# Patient Record
Sex: FEMALE
[De-identification: names, ages, dates, MRNs, and addresses within clinical notes are randomized per-mention and may not be internally consistent; named-entity substitution may affect disease eponyms.]

---

## 2018-06-23 ENCOUNTER — HOSPITAL ENCOUNTER (EMERGENCY)
Dept: HOSPITAL 92 - SCSER | Age: 73
Discharge: HOME | End: 2018-06-23
Payer: COMMERCIAL

## 2018-06-23 DIAGNOSIS — N39.0: Primary | ICD-10-CM

## 2018-06-23 DIAGNOSIS — F41.9: ICD-10-CM

## 2018-06-23 DIAGNOSIS — I10: ICD-10-CM

## 2018-06-23 DIAGNOSIS — Z79.891: ICD-10-CM

## 2018-06-23 DIAGNOSIS — J30.2: ICD-10-CM

## 2018-06-23 DIAGNOSIS — Z79.899: ICD-10-CM

## 2018-06-23 DIAGNOSIS — D64.9: ICD-10-CM

## 2018-06-23 LAB
ALBUMIN SERPL BCG-MCNC: 3.7 G/DL (ref 3.4–4.8)
ALP SERPL-CCNC: 78 U/L (ref 40–150)
ALT SERPL W P-5'-P-CCNC: 13 U/L (ref 8–55)
ANION GAP SERPL CALC-SCNC: 15 MMOL/L (ref 10–20)
AST SERPL-CCNC: 16 U/L (ref 5–34)
BACTERIA UR QL AUTO: (no result) HPF
BILIRUB SERPL-MCNC: 0.7 MG/DL (ref 0.2–1.2)
BUN SERPL-MCNC: 18 MG/DL (ref 9.8–20.1)
CALCIUM SERPL-MCNC: 9.6 MG/DL (ref 7.8–10.44)
CHLORIDE SERPL-SCNC: 105 MMOL/L (ref 98–107)
CO2 SERPL-SCNC: 22 MMOL/L (ref 23–31)
CREAT CL PREDICTED SERPL C-G-VRATE: 0 ML/MIN (ref 70–130)
GLOBULIN SER CALC-MCNC: 3.8 G/DL (ref 2.4–3.5)
GLUCOSE SERPL-MCNC: 116 MG/DL (ref 83–110)
HGB BLD-MCNC: 9.9 G/DL (ref 12–16)
MCH RBC QN AUTO: 26.7 PG (ref 27–31)
MCV RBC AUTO: 80.5 FL (ref 78–98)
MDIFF COMPLETE?: YES
PLATELET # BLD AUTO: 191 THOU/UL (ref 130–400)
PLATELET BLD QL SMEAR: (no result)
POTASSIUM SERPL-SCNC: 3.5 MMOL/L (ref 3.5–5.1)
PROT UR STRIP.AUTO-MCNC: 30 MG/DL
RBC # BLD AUTO: 3.69 MILL/UL (ref 4.2–5.4)
RBC UR QL AUTO: (no result) HPF (ref 0–3)
SODIUM SERPL-SCNC: 138 MMOL/L (ref 136–145)
SP GR UR STRIP: 1.01 (ref 1–1.03)
WBC # BLD AUTO: 10.8 THOU/UL (ref 4.8–10.8)

## 2018-06-23 PROCEDURE — 87149 DNA/RNA DIRECT PROBE: CPT

## 2018-06-23 PROCEDURE — 71046 X-RAY EXAM CHEST 2 VIEWS: CPT

## 2018-06-23 PROCEDURE — 81003 URINALYSIS AUTO W/O SCOPE: CPT

## 2018-06-23 PROCEDURE — 96361 HYDRATE IV INFUSION ADD-ON: CPT

## 2018-06-23 PROCEDURE — 87040 BLOOD CULTURE FOR BACTERIA: CPT

## 2018-06-23 PROCEDURE — 36415 COLL VENOUS BLD VENIPUNCTURE: CPT

## 2018-06-23 PROCEDURE — 80053 COMPREHEN METABOLIC PANEL: CPT

## 2018-06-23 PROCEDURE — 87086 URINE CULTURE/COLONY COUNT: CPT

## 2018-06-23 PROCEDURE — 81015 MICROSCOPIC EXAM OF URINE: CPT

## 2018-06-23 PROCEDURE — 96365 THER/PROPH/DIAG IV INF INIT: CPT

## 2018-06-23 PROCEDURE — 85025 COMPLETE CBC W/AUTO DIFF WBC: CPT

## 2018-06-23 PROCEDURE — 87077 CULTURE AEROBIC IDENTIFY: CPT

## 2018-06-23 PROCEDURE — 83605 ASSAY OF LACTIC ACID: CPT

## 2018-06-23 PROCEDURE — 87186 SC STD MICRODIL/AGAR DIL: CPT

## 2018-06-23 NOTE — RAD
PA AND LATERAL VIEWS CHEST:

6/23/18

 

HISTORY: 

Cough, fever, generalized weakness.

 

FINDINGS:  

The heart is enlarged. The lungs are well expanded with mild pulmonary vascular congestion. No pneumo
thoraces or large effusions are seen. 

 

IMPRESSION:  

Findings are suspicious for mild CHF.                                                                
                                                                                                     
                                                                                                     
                                                                                                     
                                                                                                     
                                                                                                     
                                                                                                     
                                                                                                     
                                                                                                     
                                                                                                     
                                                                                                     
                                                                                                     
                                                                                                     
                                                                                                     
                                                                                                     
                                                                                                     
                                                                                                     
                                                                                                     
                                                                                                     
                                                                                                     
                                                                                                     
                                                                                                     
                                                                                                     
                                                 

 

POS: [facility]

## 2020-06-01 NOTE — RAD
PORTABLE CHEST 1 VIEW:

 

Date:  06/01/2020

Time:  1333 hours

 

HISTORY:  

MediPort placement. 

 

COMPARISON:  

05/29/2020. 

 

FINDINGS:

Interval placement of right-sided Port-A-Cath is seen with tip in the projection of SVC. No pneumotho
rax seen. There is continued elevation of the left hemidiaphragm. The heart size is stable. 

 

 

POS: [facility]

## 2020-06-02 NOTE — OP
DATE OF PROCEDURE:  06/01/2020



PREOPERATIVE DIAGNOSIS:  Lymphoma.



POSTOPERATIVE DIAGNOSIS:  Lymphoma.



PROCEDURE PERFORMED:  Placement of right subclavian standard-sized power compatible

MediPort. 



ANESTHESIA:  Total intravenous anesthesia with local using a mixture of 1% lidocaine

with epinephrine and 0.25% Marcaine. 



INDICATIONS:  The patient is a 74-year-old  female, who is referred for

MediPort placement for chemotherapy administration.  She has diffuse lymphoma. 



DESCRIPTION OF OPERATION:  Informed consent was obtained.  The patient was taken to

the operating room where total intravenous anesthesia was obtained with the patient

in supine position.  Right periclavicular area was prepped with ChloraPrep and

draped in sterile fashion.  Local anesthetic was infiltrated and a large-gauge

needle was passed under the clavicle in the subclavian vein.  Guidewire was passed

through the needle and fluoroscopically confirmed to enter the superior vena cava.

Additional local anesthetic was infiltrated and transverse incision was created

based on needle insertion site.  A subcutaneous pocket was dissected inferiorly.

Introducer dilator was passed over the guidewire under fluoroscopic guidance.  The

guidewire and dilator were removed, and the catheter was passed through the

introducer.  The tip of the catheter was positioned at the atriocaval junction and

the catheter was trimmed to the appropriate length and secured to the locking hub of

the MediPort.  The port was then placed in the subcutaneous pocket where it was

secured to the pectoral fascia with 2 interrupted sutures of 3-0 Prolene.  The

incision was then closed in layers with 3-0 and 4-0 Monocryl.  Additional local

anesthetic was infiltrated.  The port was cannulated with a Hemphill needle and it

aspirated blood freely and was flushed with heparinized saline.  Dermabond was

placed externally on the skin incision.  There were no complications.  Blood loss

was negligible.  The patient tolerated the procedure well and was taken to recovery

room in stable condition. 



FINDINGS:  I placed a standard size power compatible MediPort in the right

subclavian vein uneventfully.  The port site was chosen secondary to her body

habitus.  There were no complications and essentially no blood loss.  Fluoroscopy

was used throughout the procedure.  The patient tolerated the procedure well and was

taken to recovery room in stable condition. 







Job ID:  [id number]

## 2020-06-12 NOTE — PDOC.HHP
Hospitalist HPI





- History of Present Illness


fever and neutropenia in clinic


History of Present Illness: 





Ms. 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 

received chemotherapy on Friday who was referred from the clinic for a 

neutropenic fever. Received chemotherapy this past Friday. Had fevers over the 

weekend, came to clinic, and reportedy had UA consistent with UTI. Started on 

Cipro and completed three days. Followed up in clinic this morning, was found 

to be neutropenic and 100.8F so was directly admitted.





On encounter, laying comfortably in bed and complains of abdominal pain that 

she thinks is because of antibiotics. Also endorses ulceration in mouth.  

Denies neck stiffness, headache, dysphagia, odynophagia, ocular drainage, 

rhinorrhea, facial pain, chest pain, pleuritic pain, cough, diarrhea, dysuria, 

hematuria.





Hospitalist ROS





- Review of Systems


Constitutional: reports: weakness, malaise.  denies: fever, chills, sweats


Eyes: denies: pain, conjunctivae inflammation, eyelid inflammation, redness


ENT: denies: ear pain, ear discharge, nose pain, nose discharge, nose congestion

, mouth pain, mouth swelling, throat pain, throat swelling


Respiratory: denies: cough, shortness of breath, pleuritic pain


Cardiovascular: denies: chest pain, palpitations


Gastrointestinal: reports: nausea, abdominal pain.  denies: vomiting, diarrhea, 

melena, hematochezia


Genitourinary: denies: dysuria, frequency, incontinence, hematuria


Skin: denies: rash, lesions





Hospitalist History





- Past Medical History


Source: patient, family


Cardiac: reports: no pertinent history


Pulmonary: reports: hypertension


CNS: reports: no pertinent history


Gastrointestinal: reports: no pertinent history


Heme/Onc: reports: no pertinent history


Hepatobiliary: reports: no pertinent history


Psych: reports: no pertinent history


Infectious Disease: reports: Other (recurrent UTIs)


ENT: reports: no pertinent history


Renal/: reports: UTI





- Past Surgical History


Past Surgical History: reports: Cystoscopy, Hysterectomy





- Family History


Family History: reports: no pertinent history





- Social History


Smoking Status: Never smoker


Alcohol: reports: None


Drugs: reports: none


Living Situation: With Family


Domestic Violence: Negative


Activity level: independent ambulation





- Exam


General Appearance: NAD, awake alert


Eye: PERRL, anicteric sclera


ENT - other findings: mulitple oral ulcerations


Neck: no JVD


Heart: no murmur, no gallops


Heart - other findings: regular rhythm, tachycardic; mediport right side thorax

, noninfected


Respiratory: CTAB, no wheezes, no rales, no ronchi


Gastrointestinal: soft, non-tender, non-distended, normal bowel sounds


Extremities: no edema


Neurological: cranial nerve grossly intact, normal sensation to touch, no 

weakness, no focal deficits, no new deficit


Psychiatric: normal affect, normal behavior, A&O x 3





Hospitalist Results





- Labs


Result Diagrams: 


 06/12/20 15:38





 06/12/20 15:38


Lab results: 


 











WBC  0.1 thou/uL (4.8-10.8)  L*  06/12/20  15:38    


 


Hgb  10.0 g/dL (12.0-16.0)  L  06/12/20  15:38    


 


Hct  29.8 % (36.0-47.0)  L  06/12/20  15:38    


 


MCV  82.8 fL (78.0-98.0)   06/12/20  15:38    


 


Plt Count  39 thou/uL (130-400)  L  06/12/20  15:38    














Hospitalist H&P A/P





- Problem


(1) Neutropenic fever


Code(s): D70.9 - NEUTROPENIA, UNSPECIFIED; R50.81 - FEVER PRESENTING WITH 

CONDITIONS CLASSIFIED ELSEWHERE   Status: Acute   





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


Code(s): C83.31 - DIFFUSE LARGE B-CELL LYMPHOMA, NODES OF HEAD, FACE, AND NECK 

  Status: Acute   





- Plan


Plan: 





#neutropenic fever


#mucositis


-patient recently diagnosed with large B cell lymphoma


-chemotherapy this past friday cytoxan, vincristine, adramycin, vatuxin


-Tmax 100.8, given cefepime x 1 in clinic


-recent urinalysis in clinic c/w UTI, completed cipro prior to presentation


-lymphoma can cause fever, lymphoma lysis can cause fever, treatment with 

abovementioned can cause fever and mucositis associated with fever


-mediport noninfected; no symtpoms of infection





-neutorpenic isolation


-continue cefepime


-urinalysis considering recent reported UTI and history of recurrent UTIs


-procalcitonin





#HTN


-continue home regimen


-IV antiHTN PRN





#history of DVT


-continue warfarin





Disposition/PPx


full code.


GI PPx: no indicated


DVT PPx: lovenox

## 2020-06-13 NOTE — CON
DATE OF CONSULTATION:  



REASON FOR CONSULT:  Neutropenic fever.



HISTORY OF PRESENT ILLNESS:  Ms. Fair is a pleasant 74-year-old female who has a

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

lymphadenopathy.  She has activated B-cell subtype with no evidence of double hit.

She completed cycle 1 of R-CHOP last Friday.  On Monday, she presented to our clinic

with urinary tract infection symptoms and was given Cipro.  Over the course of the

week, she began to have stomach pain, weight loss, and developed fever.  She

presented to our clinic on Friday for re-evaluation and appeared weak.  Her CBC

showed a white count of 0.1.  She was started on IV hydration and given 2 g of

cefepime in the clinic and admitted for neutropenic fever.  She also had oral thrush

with some stomatitis.  She denies diarrhea, cough, shortness of breath, or chest

pain. 



PAST MEDICAL HISTORY:  

1. 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

2. Left lower extremity DVT in March.

3. Rheumatoid arthritis.

4. Chronic anemia.

5. Hypertension.



PAST SURGICAL HISTORY:  

1. Hysterectomy.

2. Lymph node biopsy.



ALLERGIES:  NO KNOWN DRUG ALLERGIES.



HOME MEDICATIONS:  Fosamax, alprazolam, carvedilol, Flonase, loratadine, losartan,

nystatin, sertraline, and Coumadin. 



FAMILY HISTORY:  Daughter has a history of cervical cancer.



SOCIAL HISTORY:  She is , has 4 children.  Lives with her spouse.  No

alcohol, tobacco, or illicit drug use. 



REVIEW OF SYSTEMS:  A 10-point review of systems is negative except for noted in HPI.



PHYSICAL EXAMINATION:

VITAL SIGNS:  Temperature 98.6, pulse 89, respiratory rate 18, BP is 137/63.  She is

95% on room air. 

GENERAL:  This is a well-developed, well-nourished female, in no acute distress. 

HEENT:  Normocephalic, atraumatic.  Pupils are equal and reactive to light.  She has

thrush on her tongue and sores in her buccal cavity. 

NECK:  Supple. 

CV:  Regular rate and rhythm. 

LUNGS:  Clear. 

ABDOMEN:  Soft and nontender.  Bowel sounds are positive. 

EXTREMITIES:  No clubbing or cyanosis. 

SKIN:  No rash. 

HEMATOLOGICAL:  No petechiae or purpura. 

NEUROLOGICAL:  Nonfocal.



PERTINENT LABORATORY DATA AND X-RAYS:  Current WBCs are 0.1, hemoglobin 8.0,

hematocrit 24.6, platelet count is 33,000.  PT is 23.4, INR is 2.1.  Sodium is 140,

potassium 3.9, chloride 111, CO2 is 25, BUN is 12, creatinine 0.74, calcium 7.4,

bilirubin is 1, AST is 25, ALT is 43, alkaline phosphatase is 72.  Serum total

protein is 5.6, albumin 2.9, globulin 2.7.  Urine showed rare bacteria. 



ASSESSMENT:  

1. Neutropenic fever, likely from prior urinary tract infection.

2. High-grade B-cell lymphoma, status post cycle 1 of R-CHOP chemotherapy with

Neulasta support. 

3. Thrush.

4. History of deep venous thrombosis.

5. Thrombocytopenia secondary to chemo.



DISCUSSION:  The patient has been started on IV antibiotics, IV fluids, and nystatin

medication.  She is also receiving Magic mouthwash.  She feels significantly better

today.  She has just rare stomach pain and is now ambulating in the hallway.  Her

white count has remained low.  She did receive Neulasta.  I expect it to begin to

rise in the next 24 hours.  Her platelet count is 33,000.  We will hold her Coumadin

today and resume tomorrow as her platelets will hopefully be over 40,000.  If she is

significantly improved with the trending upward of her labs, she can be discharged

home to be followed up in the clinic next week.  Appreciate Sounds assistance with

medical management. 



Thank you for the consult.







Job ID:  [id number]

## 2020-06-13 NOTE — PDOC.HOSPP
- Subjective


Encounter Date: 06/13/20


Encounter Time: 07:00


Subjective: 





Pt seen for followup re: febrile neutropenia.  Pt feels slightly better.





- Objective


Vital Signs & Weight: 


 Vital Signs (12 hours)











  Temp Pulse Resp BP BP Pulse Ox


 


 06/13/20 11:36  98.6 F  89  18   137/63  95


 


 06/13/20 08:00       95


 


 06/13/20 07:58  98.3 F  80  18  165/70 H   95


 


 06/13/20 04:00  98.5 F  90  18   158/70 H  18 L








 Weight











Weight                         142 lb 1.6 oz














I&O: 


 











 06/12/20 06/13/20 06/14/20





 06:59 06:59 06:59


 


Intake Total  340 


 


Output Total  725 


 


Balance  -385 











Result Diagrams: 


 06/13/20 06:00





 06/13/20 06:00


Additional Labs: 





Labs and MARs reviewed by me











Hospitalist ROS





- Review of Systems


Cardiovascular: denies: chest pain, palpitations, orthopnea, paroxysmal noc. 

dyspnea, edema, light headedness


Gastrointestinal: denies: nausea, vomiting, abdominal pain, diarrhea, 

constipation, melena, hematochezia





- Medication


Medications: 


Active Medications











Generic Name Dose Route Start Last Admin





  Trade Name Freq  PRN Reason Stop Dose Admin


 


Acetaminophen  650 mg  06/12/20 15:02  06/13/20 00:37





  Tylenol  PO   650 mg





  Q4H PRN   Administration





  Headache/Fever/Mild Pain (1-3)   





     





     





     


 


Allopurinol  300 mg  06/13/20 09:00  06/13/20 08:46





  Zyloprim  PO   300 mg





  DAILY KESHIA   Administration





     





     





     





     


 


Alprazolam  0.25 mg  06/12/20 15:09  06/13/20 08:22





  Xanax  PO   0.25 mg





  TIDPRN PRN   Administration





  Anxiety   





     





     





     


 


Carvedilol  25 mg  06/12/20 21:00  06/13/20 08:46





  Coreg  PO   25 mg





  BID KESHIA   Administration





     





     





     





     


 


Al Hydroxide/Mg Hydroxide 60  0 ml  06/12/20 16:33  06/13/20 11:51





ml/ Lidocaine HCl 30 ml/  SSW   10 ml





Diphenhydramine HCl 75 mg  PRN PRN   Administration





  Mouth Irritation   





     





     





     


 


Hyoscyamine Sulfate  0.125 mg  06/12/20 15:09  06/12/20 17:05





  Levsin  PO   0.125 mg





  Q4H PRN   Administration





  Abdominal Cramping   





     





     





     


 


Sodium Chloride  1,000 mls @ 75 mls/hr  06/12/20 14:45  06/13/20 01:34





  Normal Saline 0.9%  IV   Not Given





  [id number] KESHIA   





     





     





     





     


 


Cefepime HCl 1 gm/ Sodium  100 mls @ 200 mls/hr  06/12/20 23:00  06/13/20 11:06





  Chloride  IVPB   100 mls





  1100,2300 KESHIA   Administration





     





     





     





     


 


Losartan Potassium  100 mg  06/13/20 09:00  06/13/20 08:47





  Cozaar  PO   100 mg





  DAILY KESHIA   Administration





     





     





     





     


 


Prednisone  100 mg  06/13/20 08:00  06/13/20 08:45





  Prednisone  PO   100 mg





  QAM-WM KESHIA   Administration





     





     





     





     


 


Sodium Chloride  10 ml  06/13/20 09:00  06/13/20 11:06





  Flush - Normal Saline  IVF   Not Given





  Q12HR KESHIA   





     





     





     





     














- Exam


General Appearance: awake alert


Eye: anicteric sclera


ENT: moist mucosa


ENT - other findings: mucositis


Neck: supple


Heart: RRR, no rubs


Respiratory: CTAB


Gastrointestinal: soft, non-tender


Extremities: no clubbing


Psychiatric: normal affect, normal behavior





Hosp A/P





- Plan





- Assessment


(1) Neutropenic fever


Code(s): D70.9 - NEUTROPENIA, UNSPECIFIED; R50.81 - FEVER PRESENTING WITH 

CONDITIONS CLASSIFIED ELSEWHERE   Status: Acute   





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


Code(s): C83.31 - DIFFUSE LARGE B-CELL LYMPHOMA, NODES OF HEAD, FACE, AND NECK 

  Status: Acute   





- Plan





#neutropenic fever


-continue IV cefepime


-await cultures


-neutorpenic isolation





#mucositis


-PRN mucositis cocktail





#HTN


-controlled





#history of DVT


-continue warfarin

## 2020-06-14 NOTE — PDOC.MOPN
Interval History: 





stomach pain this am, constipation. Mouth hurts





- Vital Signs


Vital Signs: 


 Vital Signs (12 hours)











  Temp Pulse Resp BP Pulse Ox


 


 06/14/20 08:00      97


 


 06/14/20 07:37  98.0 F  89  18  167/71 H  97








 Weight











Weight                         142 lb 1.6 oz

















- Physical Exam


General: No acute distress


HEENT: Other (mucositis)


Lungs: Clear to auscultation, Normal air movement


Cardiovascular: Regular rate, Normal S1, Normal S2, No murmurs, Gallops, Rubs


Abdomen: Normal bowel sounds, Other


Extremities: No clubbing, No cyanosis, No edema, Normal pulses, No tenderness/

swelling


Neurological: Normal gait, Normal speech, Strength at 5/5 X4 ext, Normal tone, 

Sensation intact, Cranial nerves 3-12 NL, Reflexes 2+





- Labs


Result Diagrams: 


 06/14/20 07:08





 06/13/20 06:00


Lab results: 


 Laboratory Results - last 24 hr





06/14/20 07:08: WBC 0.6 L*, RBC 2.91 L, Hgb 8.1 L, Hct 24.6 L, MCV 84.5, MCH 

27.7, MCHC 32.8, RDW 14.1, Plt Count 40 L, MPV 9.6, Neutrophils % (Manual) 55, 

Band Neuts % (Manual) 15 H, Lymphocytes % (Manual) 9 L, Reactive Lymphs % 3, 

Monocytes % (Manual) 18 H, Nucleated RBCs # (Man) 1 H, Plt Morphology Comment 

Appears Decreased L


06/14/20 06:00: PT 18.7 H, INR 1.6








Status: lab reviewed by me





A/P





- Problem


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


Current Visit: Yes   Code(s): C83.31 - DIFFUSE LARGE B-CELL LYMPHOMA, NODES OF 

HEAD, FACE, AND NECK   Status: Acute   





(2) Neutropenic fever


Current Visit: Yes   Code(s): D70.9 - NEUTROPENIA, UNSPECIFIED; R50.81 - FEVER 

PRESENTING WITH CONDITIONS CLASSIFIED ELSEWHERE   Status: Acute   





- Plan


Plan: 





1. continue nystation and MM wash


2. resume coumadin today, platelets improved


3. full liquid diet for mouth pain


4. Protonix, IVF


5. CBC in am

## 2020-06-14 NOTE — PDOC.HOSPP
- Subjective


Encounter Date: 06/14/20


Encounter Time: 07:00


Subjective: 





Pt een for followup for neutropenic fever.  No cough or urinary symptoms.





- Objective


Vital Signs & Weight: 


 Vital Signs (12 hours)











  Temp Pulse Resp BP Pulse Ox


 


 06/14/20 08:00      97


 


 06/14/20 07:37  98.0 F  89  18  167/71 H  97








 Weight











Weight                         142 lb 1.6 oz














I&O: 


 











 06/13/20 06/14/20 06/15/20





 06:59 06:59 06:59


 


Intake Total 340 1500 


 


Output Total 725 600 


 


Balance -385 900 











Result Diagrams: 


 06/14/20 07:08





 06/13/20 06:00


Additional Labs: 





Labs and MARs reviewed by me








Hospitalist ROS





- Review of Systems


Constitutional: denies: fever, chills, sweats, weakness, malaise


Respiratory: denies: cough, shortness of breath, SOB with excertion, pleuritic 

pain


Genitourinary: denies: dysuria, frequency, incontinence, hematuria


Skin: denies: rash, lesions, kadeem, bruising





- Medication


Medications: 


Active Medications











Generic Name Dose Route Start Last Admin





  Trade Name Freq  PRN Reason Stop Dose Admin


 


Acetaminophen  650 mg  06/12/20 15:02  06/13/20 00:37





  Tylenol  PO   650 mg





  Q4H PRN   Administration





  Headache/Fever/Mild Pain (1-3)   





     





     





     


 


Allopurinol  300 mg  06/13/20 09:00  06/14/20 08:35





  Zyloprim  PO   300 mg





  DAILY KESHIA   Administration





     





     





     





     


 


Alprazolam  0.25 mg  06/12/20 15:09  06/14/20 07:19





  Xanax  PO   0.25 mg





  TIDPRN PRN   Administration





  Anxiety   





     





     





     


 


Bisacodyl  10 mg  06/14/20 09:15  06/14/20 09:32





  Dulcolax  PO  06/14/20 12:00  10 mg





  NOW KESHIA   Administration





     





     





     





     


 


Carvedilol  25 mg  06/12/20 21:00  06/14/20 08:35





  Coreg  PO   25 mg





  BID KESHIA   Administration





     





     





     





     


 


Al Hydroxide/Mg Hydroxide 60  0 ml  06/12/20 16:33  06/14/20 07:19





ml/ Lidocaine HCl 30 ml/  SSW   10 ml





Diphenhydramine HCl 75 mg  PRN PRN   Administration





  Mouth Irritation   





     





     





     


 


Docusate Sodium  100 mg  06/14/20 09:15  06/14/20 09:32





  Colace  PO  06/14/20 11:15  100 mg





  NOW KESHIA   Administration





     





     





     





     


 


Hyoscyamine Sulfate  0.125 mg  06/12/20 15:09  06/12/20 17:05





  Levsin  PO   0.125 mg





  Q4H PRN   Administration





  Abdominal Cramping   





     





     





     


 


Sodium Chloride  1,000 mls @ 75 mls/hr  06/12/20 14:45  06/14/20 08:38





  Normal Saline 0.9%  IV   1,000 mls





  [id number] KESHIA   Administration





     





     





     





     


 


Cefepime HCl 1 gm/ Sodium  100 mls @ 200 mls/hr  06/12/20 23:00  06/13/20 23:17





  Chloride  IVPB   100 mls





  1100,2300 KESHIA   Administration





     





     





     





     


 


Losartan Potassium  100 mg  06/13/20 09:00  06/14/20 08:35





  Cozaar  PO   100 mg





  DAILY KESHIA   Administration





     





     





     





     


 


Multivitamins  1 tab  06/14/20 09:00  06/14/20 09:32





  Theragran  PO   1 tab





  DAILY KESHIA   Administration





     





     





     





     


 


Sodium Chloride  10 ml  06/13/20 09:00  06/14/20 08:36





  Flush - Normal Saline  IVF   Not Given





  Q12HR KESHIA   





     





     





     





     














- Exam


General Appearance: awake alert


Eye: anicteric sclera


ENT: normocephalic atraumatic


Neck: supple, no JVD


Heart: RRR, normal peripheral pulses


Respiratory: CTAB


Gastrointestinal: soft, non-tender


Extremities: no clubbing


Musculoskeletal: no muscle wasting


Psychiatric: normal affect, normal behavior





Hosp A/P





- Plan





- Assessment


(1) Neutropenic fever


Code(s): D70.9 - NEUTROPENIA, UNSPECIFIED; R50.81 - FEVER PRESENTING WITH 

CONDITIONS CLASSIFIED ELSEWHERE   Status: Acute   





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


Code(s): C83.31 - DIFFUSE LARGE B-CELL LYMPHOMA, NODES OF HEAD, FACE, AND NECK 

  Status: Acute   





- Plan





#neutropenic fever


-continue IV cefepime


- pt clinically improving


-neutorpenic isolation


- WBC imprpved to 600





#mucositis


-pt improving with PRN mucositis cocktail





#HTN


-controlled





#history of DVT


-continue coumadin

## 2020-06-15 NOTE — PDOC.MOPN
Interval History: 





large BM yesterday, mouth much better





- Vital Signs


Vital Signs: 


 Vital Signs (12 hours)











  Temp Pulse Resp BP BP Pulse Ox


 


 06/15/20 08:00  98.6 F  86  18  147/67 H   95


 


 06/15/20 04:54  98.4 F  88  18   171/74 H  96


 


 06/15/20 04:00  98.4 F  88  16   171/74 H  94 L








 Weight











Weight                         142 lb 1.6 oz

















- Physical Exam


General: Alert, Oriented x3, No acute distress


HEENT: Atraumatic, PERRLA, EOMI, Mucous membr. moist/pink


Lungs: Clear to auscultation, Normal air movement


Cardiovascular: Regular rate, Normal S1, Normal S2, No murmurs, Gallops, Rubs


Abdomen: Normal bowel sounds, Soft, No tenderness, No hepatospenomegaly, No 

masses


Skin: No rashes, No breakdown, No significant lesion


Neurological: Normal gait, Normal speech, Strength at 5/5 X4 ext, Normal tone, 

Sensation intact, Cranial nerves 3-12 NL, Reflexes 2+


Psych/Mental Status: Mental status NL, Mood NL





- Labs


Result Diagrams: 


 06/15/20 04:52





 06/13/20 06:00


Lab results: 


 Laboratory Results - last 24 hr





06/15/20 04:52: WBC 2.8 L, RBC 3.11 L, Hgb 8.3 L, Hct 25.9 L, MCV 83.1, MCH 

26.6 L, MCHC 32.0, RDW 14.1, Plt Count 53 L, MPV 10.2, Neutrophils % (Manual) 46

, Band Neuts % (Manual) 42 H, Lymphocytes % (Manual) 3 L, Monocytes % (Manual) 8

, Metamyelocytes % (Man) 1 H, Plt Morphology Comment Appears Decreased L


06/15/20 04:52: PT 19.0 H, INR 1.6








Status: lab reviewed by me





A/P





- Problem


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


Current Visit: Yes   Code(s): C83.31 - DIFFUSE LARGE B-CELL LYMPHOMA, NODES OF 

HEAD, FACE, AND NECK   Status: Acute   





(2) Neutropenic fever


Current Visit: Yes   Code(s): D70.9 - NEUTROPENIA, UNSPECIFIED; R50.81 - FEVER 

PRESENTING WITH CONDITIONS CLASSIFIED ELSEWHERE   Status: Acute   





- Plan


Plan: 





1. WBC and platelets improved


2. mouth pain better


3. ok to dc home and follow-up with DR. Connor on 6/25

## 2020-06-16 NOTE — DIS
DATE OF ADMISSION:  06/12/2020



DATE OF DISCHARGE:  06/15/2020



PRIMARY CARE PROVIDER:  Unknown.



DISCHARGE DIAGNOSES:  

1. Neutropenic fever.

2. Pancytopenia.

3. Hypokalemia.



CONDITION OF PATIENT ON THE DAY OF DISCHARGE:  Stable. 



I assessed Ms. Fair on the day of discharge.  She denies any chest pain or

shortness of breath.  Vital signs are stable.  S1 and S2 are heard, regular.  Lungs

are clear to auscultation bilaterally. 



CONSULTATIONS DURING THIS HOSPITALIZATION:  Oncology, Ms. Magalis Bingham.



HOSPITAL COURSE:  Ms. Fair is a pleasant 74-year-old lady, who was admitted to

[facility] on June 12, 2020, for neutropenic fever.  She was

seen by Oncology Service.  She was treated with intravenous antibiotics.  Her

neutropenia resolved.  She has been cleared for discharge by Oncology Service. 



On the day of discharge, she has INR 1.6.  White count 2800, hemoglobin 8.3, and

platelet count 53,000.  Sodium is 140, potassium 3.9, and creatinine 0.74. 



Many thanks for allowing me to participate in your patient's care.  Please feel free

to contact me with any questions or concerns. 



DISCHARGE MEDICATIONS:  Ciprofloxacin 500 mg 2 times a day for 3 more days.

Otherwise, no change was made to her pre-admission home medications. 



DIET:  Heart healthy.



ACTIVITY:  No restrictions.



DISCHARGE DESTINATION:  Home.



TIME SPENT:  Total amount of time spent in coordinating this discharge, 28 minutes.







Job ID:  [id number]

## 2020-06-29 NOTE — RAD
Exam: Lumbar puncture for CSF acquisition and intrathecal chemotherapy administration.



HISTORY: Lymphoma

Comparison none



FINDINGS: 5 lumbar type vertebra. Lumbar spine vertebral body heights are maintained. No fracture. Mi
ld degenerative changes throughout the lumbar spine.

Successful lumbar puncture. A total of 9 cc of clear CSF was acquired. Total of 0.5 mg of methotrexat
e was administered. Methotrexate was in a total of 10 cc solution. Patient tolerated the procedure

well. No immediate or postprocedure complications



TECHNIQUE: Consent obtained to perform a lumbar puncture for CSF acquisition and chemotherapy agent a
dministration. Patient's back was evaluated. The L3-L4 level was deemed appropriate. Skin was

prepped and draped in a sterile fashion. 1% lidocaine, buffered with sodium bicarbonate was used for 
local anesthesia. Under fluoroscopic guidance, a 22-gauge spinal needle was advanced into the CSF

space. Prompt flow of clear CSF to the hub of the needle. Via a short tubing catheter, total of 9 cc 
of CSF was collected. Patient tolerated the procedure well. No immediate or postprocedural

complications.



Exposure: 0.4 minutes; 155.9 mcg/sq m



IMPRESSION: Successful lumbar puncture for CSF acquisition and intrathecal contrast administration.



Reported By: Bee Arroyo 

Electronically Signed:  6/29/2020 11:24 AM

## 2020-07-20 NOTE — RAD
FLUOROSCOPICALLY GUIDED LUMBAR PUNCTURE

INTRATHECAL INJECTION OF CHEMOTHERAPEUTIC AGENT:



DATE:

7/20/2020



HISTORY:

74-year-old female with B-cell lymphoma for intrathecal injection of chemotherapy.



TECHNIQUE:

Signed informed consent obtained. Patient placed prone on fluoroscopy table. Skin of lower back prepa
red and draped in usual sterile fashion. 25-gauge needle used to apply buffered lidocaine

superficially and deeply.

Level selected:L2-3.

Approach:midline.

22-gauge spinal needle advanced into spinal canal under brief, intermittent fluoroscopy.

Upon return of CSF, 10 mL of CSF collected and placed into 4 separate vials.

12 mg of Methotrexate mixed with 9.52 (total of 10 mL) injected into intrathecal space.

Spinal needle was removed.

Patient tolerated procedure well. No complications.

Total fluoroscopy time:1.2 minutes.

Dose area product:146.9 uGy*m^2.



IMPRESSION:

1. Successful lumbar puncture:10 cerebrospinal fluid sent to laboratory for analysis.

2. Successful intrathecal injection of12 mg of methotrexate.



Reported By: Bradley Hall 

Electronically Signed:  7/20/2020 10:35 AM

## 2020-08-31 NOTE — HP
CHIEF COMPLAINT:  Hypoxia, short of breath.



HISTORY OF PRESENT ILLNESS:  The patient is very pleasant, but unfortunate

74-year-old  female, who has significant past medical histories of

nasopharyngeal large B-cell lymphoma, who is status post 4 rounds of chemo on 
August 17, 2020.  She presented to her oncologist's office today for followup.  While

there, she was found hypoxic and short of breath.  It was reported that her 
oxygen

was in the 70s and 80s on room air.  I have discussed with her oncologist, Dr. Connor, she thought that patient may have volume overloaded given the fact 
that she

recently received blood transfusion.  She was subsequently referred for direct

admission for possible diuresis.  I evaluated the patient in the oncology unit.
  Her

blood pressure on arrival was 74/38. She was given a stat bolus and systolic 
went up to 80s. She is still alert and oriented x 3.

The patient complained of shortness of breath, stated that has been gradually 
worsened for the last 3 to 4 days especially with

exertions.  She denies any chest pain, no fever.  No recent travel history.  No

COVID exposure.  She endorses some nonproductive cough.  She denies any 
significant

weight change.  No lower extremity swelling. 



ALLERGIES:  NO KNOWN DRUG ALLERGIES.



HOME MEDICATIONS:  Home medication list is not available to review at this time.



PAST MEDICAL HISTORY:  

1. Large B-cell lymphoma, undergoing chemotherapy.

2. Hypertension.



PAST SURGICAL HISTORY:  Hysterectomy.



SOCIAL HISTORY:  The patient lives with her .  She denies any history of

smoking or alcohol use. 



FAMILY HISTORY:  Significant for sister with heart disease.



REVIEW OF SYSTEMS:  Complete review of systems has been assessed and discussed 
with

the patient.  Negative and positive pertinent symptoms noted in HPI.  Otherwise,

complete review of systems reviewed and negative. 



LABORATORY DATA AND IMAGING STUDY:  The patient is directly admitted.  No lab is

available to review at this time. 



Stat chest x-ray showed multifocal pneumonia. I have personally reviewed. 



EKG: NSR with HR in 60s



PHYSICAL EXAMINATION:

VITAL SIGNS:  Temperature is 97.5, pulse 75, respiratory rate 18, O2 saturation 
95%

on 2 L, blood pressure 74/38. 

GENERAL APPEARANCE:  The patient appears to be not in acute distress. 

HEENT:  Normocephalic, atraumatic.  Mucous membranes moist. 

NECK:  Supple.  No lymphadenopathy.  No JVD. 

CARDIOVASCULAR:  Regular rate and rhythm.  S1 and S2 noted.  No murmur. 

PULMONOLOGY:  Bilateral crackles appreciated at bibasilar area, expiratory 
wheezes

appreciated. 

ABDOMEN:  Soft, nontender, nondistended.  Positive bowel sounds. 

EXTREMITIES:  No edema. 

NEUROLOGY:  Cranial nerves II through XII grossly intact.  No focal weakness. 

PSYCHIATRIC:  The patient alert and oriented x3 with normal affect.



ASSESSMENT AND PLAN:  This is an unfortunate 74-year-old  female, who 
has

significant past medical history of nasopharyngeal large B-cell lymphoma, 
undergoing

chemotherapy, who was sent from the oncology office for hypoxia.  Upon arrival, 
she

was found hypotensive with a blood pressure of 74/38.  She was given a stat 
normal

saline bolus.  Her blood pressures went up to the 80s. 





1. Acute hypoxic respiratory failure. Chest x-ray shows multifocal pneumonia.  
However, this could be due to volume given

history of recent blood transfusion.  Unfortunately, the patient is directly

admitted and no lab is available to review.  We will check BNP, stat labs, 
blood cultures.  We

will start her on empiric IV antibiotics for possible pneumonia.  We will 
transfer

the patient to [facility] for higher level of care.  We will obtain CTA to further 
assess

her lung fields and rule out PE.  We will monitor her blood pressure closely.  
Check 2D echo to

assess her LV function. 



1. Hypotension, need to rule out sepsis, especially with chest x-ray showing

multifocal pneumonia.  Her lab is not available at this time.  We will follow 
her

CBC.  The patient was given a liter of bolus.  Her blood pressure went up 
slightly.

The patient will need to transfer to step-down unit for close monitor.  She may 
need

vasopressor support.  The patient has been started on broad-spectrum IV 
antibiotics,

and blood culture has been obtained.  Follow echo. 



2. Multifocal pneumonia on chest x-ray.  It is unclear whether there is 
infectious

etiology versus pulmonary edema.  We will check CTA to rule out PE as well as to

assess her lung fields.  The patient will be covered with broad-spectrum IV

antibiotic to cover for possible hospital-associated pneumonia.  Follow culture.

Breathing treatments.



3. Large B-cell lymphoma, undergoing chemotherapy with Dr. Connor.  We will 
consult

her for further management. 



4. Deep venous thrombosis prophylaxis.  We will start her on Lovenox.



5. Gastrointestinal prophylaxis.  IV Pepcid.



CODE STATUS:  I have discussed code status with the patient and her family, she 
is

full code per her request. 



Thank you for allowing us to participate in this patient's care.







Job ID:  [id number]



MTDD

## 2020-08-31 NOTE — CT
CT ANGIOGRAM THORAX WITH IV CONTRAST AND 3-D RECONSTRUCTIONS



CLINICAL INDICATION:

Hypoxia, hypotension, history of B-cell lymphoma. 



COMPARISON:

4/23/2020



FINDINGS:

Pulmonary arteries: No filling defects are seen in the pulmonary arteries to suggest a pulmonary embo
karla.



Aorta: Scattered vascular calcifications are seen in the thoracic aorta. The thoracic aorta is normal
 in caliber without evidence of an aortic dissection.



Lungs: There are scattered areas of interstitial thickening as well as groundglass opacities. Similar
 finding was seen on the prior exam, but findings on today's examination are increased.  No

parenchymal consolidation or pleural fluid is seen.



Mediastinum: A right subclavian Mediport catheter remains in place. Calcified mediastinal and hilar l
ymph nodes are identified. Previously seen enlarged mediastinal lymph nodes as well as

infraclavicular lymphadenopathy has resolved. There is a mildly enlarged left paratracheal lymph node
 in the superior mediastinum measuring 1 cm in short axis dimension which is considerably decreased

in size previously measuring 2.6 cm in short axis dimension. The heart is mildly enlarged. Small hiat
al hernia is present.



Thyroid gland: Normal CT appearance.



Osseous structures: No suspicious lytic or sclerotic osseous lesion..



Chest wall: No abnormality visualized.



Upper abdomen: Within normal limits for phase of imaging. 



IMPRESSION:

1. No CT evidence of a pulmonary embolus. 

2. Mild cardiomegaly.

3. Areas of interstitial thickening and groundglass opacities seen throughout the lungs diffusely whi
ch has increased from prior exam. Findings could be related to pneumonitis.

4. Previously seen mediastinal lymphadenopathy has resolved. There has been considerable decrease in 
size of a large superior mediastinal lymph node as described above. No additional or new enlarged

lymph nodes are seen..



Reported By: Huseyin Jorge 

Electronically Signed:  8/31/2020 3:44 PM

## 2020-08-31 NOTE — RAD
EXAM:

Chest 2 views:



HISTORY:

Hypoxia and lymphoma



COMPARISON:

6/1/2020



FINDINGS:

There is an enlarged but stable cardiomediastinal silhouette.  Increased interstitial lung markings a
re present. There is stable elevation the left hemidiaphragm. A Mediport is unchanged in position.

There is no evidence of consolidation, mass, or pleural effusion.   No acute osseous abnormality.



IMPRESSION:

Worsening multifocal interstitial lung markings could represent an atypical infiltrate or interstitia
l lung disease.



Reported By: Robert Beal 

Electronically Signed:  8/31/2020 1:18 PM

## 2020-09-01 NOTE — CON
DATE OF CONSULTATION:  09/01/2020



REASON FOR CONSULTATION:  Pulmonary infiltrates.



CONSULTING PROVIDER:  Magalis Bingham, Nurse practitioner.



HISTORY OF PRESENT ILLNESS:  The patient is a pleasant 74-year-old female, who was

admitted to this facility on 08/31/2020 from Dr. Connor's office.  She presented

with increasing shortness of breath and hypoxemia.  She recently completed her 4th

round of chemotherapy for B-cell lymphoma.  I am told that consisted of Adriamycin,

Cytoxan, vincristine, and Rituxan.  She has had pancytopenia developed from the

chemo in the past. 



She denies cough, but says that she does feel short of breath.  She has had some

problems with nasal congestion.  She has had no hemoptysis. 



PAST MEDICAL HISTORY:  

1. 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

2. Hypertension.



PAST SURGICAL HISTORY:  

1. Hysterectomy.

2. MediPort placement.



SOCIAL HISTORY:  No history of smoking or alcohol use.  Lives at home with her

.  Fully active with daily activity of living. 



REVIEW OF SYSTEMS:  Has had no subjective fever, chills, nausea, vomiting,

hematemesis, melena, hematochezia, hematuria, or dysuria. 



MEDICATIONS:  Prior to admission in addition to her chemotherapy regimen, she was

taking; 

1. Metamucil.

2. Tylenol.

3. Levsin.

4. Norco.

5. Compazine.

6. Nystatin swish and swallow.

7. Loratadine.

8. Pantoprazole.

9. Megace.

10. Alprazolam.

11. Carvedilol.

12. Losartan.

13. Sertraline.

14. Arixtra.

15. Prednisone. 

Current inpatient medications include cefepime, vancomycin, and IV

methylprednisolone. 



PHYSICAL EXAMINATION:

VITAL SIGNS:  Her O2 saturation is running in the mid to low 90s on 3 L nasal

cannula, pulse 93, respirations in the mid 20s, blood pressure 120/65.  Last

measured temperature 98.4. 

GENERAL:  She is pleasant female, who sits up, gives history without limitation. 

HEENT:  Remarkable for alopecia. 

NECK:  No adenopathy or JVD or bruits. 

LUNGS:  She has inspiratory crackles predominantly at the bases.  She has decreased

diaphragmatic excursion on the left. 

CARDIOVASCULAR:  S1 and S2.  Regular without audible murmur. 

ABDOMEN:  Soft and nontender to palpation. 

EXTREMITIES:  No clubbing, cyanosis, or edema.



LABORATORY DATA:  Her echocardiogram showed no evidence of systolic dysfunction. 



Urinalysis showed greater than 50 white blood cells and 5 to 6 red blood cells.

Sodium 142, potassium 3.1, chloride 113, CO2 of 16, BUN 19, creatinine 0.9, glucose

131, magnesium 1.3.  Troponin 0.038.  .  White blood cell count 4.9,

hemoglobin 6.1, hematocrit 18.7, and platelet count 134.  I have reviewed her CT

from this visit as well as a previous PET scan from May.  I have also reviewed

multiple chest x-rays over the last several years.  She has copious areas of

ground-glass infiltration surrounded by fibrotic changes.  Additionally, she has a

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

result of the lymphoma.  Her current CT scan shows dissipation of lymphadenopathy. 



ASSESSMENT:  The patient is presenting with fairly profound ground-glass and

fibrotic changes on CT of the chest that were present at some degree back in May.

This would bring a differential diagnosis of nonspecific interstitial pneumonitis,

atypical infection, __________ effects of the chemotherapy.  It would appear that

this is not cardiac in nature given normal EF on recent echo.  However, high-output

heart failure cannot be ruled out given her profound anemia. 



Additionally, this patient has a paralyzed left hemidiaphragm that is probably

contributing to reduced lung capacity. 



PLAN:  

1. The patient is already on antibiotics and steroids and is beginning to feel

better.  For the time being, I would just monitor her on that regimen and see how

she does. 

2. Correct anemia with transfusion as you are doing.

3. For any worsening, we would need to go in and sample and perform a BAL and send

that off for atypical organisms such as Pneumocystis or fungal.  I would hold off on

the intervention for the time being. 

Thank you for the referral.  I will be glad to follow with you.  This was discussed

with Ms. Zachery. 







Job ID:  [id number]

## 2020-09-01 NOTE — PDOC.HOSPP
- Subjective


Subjective: 





Hb dropped to 6.1, she has been typed and crossed 2 units. 


feeling better. BP improved with intravascular expansion. 





- Objective


Vital Signs & Weight: 


 Vital Signs (12 hours)











  Temp Pulse Resp Pulse Ox


 


 09/01/20 14:21   93  32 H  94 L


 


 09/01/20 12:04  98.4 F   


 


 09/01/20 11:15   93  25 H  98


 


 09/01/20 08:41   95  28 H  100


 


 09/01/20 08:38   95  28 H  100


 


 09/01/20 08:00     100


 


 09/01/20 07:38  98.4 F   








 Weight











Admit Weight                   124 lb 14.4 oz


 


Weight                         124 lb 14.4 oz











 Most Recent Monitor Data











Heart Rate from ECG            92


 


NIBP                           120/65


 


NIBP BP-Mean                   83


 


Respiration from ECG           34


 


SpO2                           92














I&O: 


 











 08/31/20 09/01/20 09/02/20





 06:59 06:59 06:59


 


Intake Total  1010 0


 


Output Total  725 


 


Balance  285 0











Result Diagrams: 


 09/01/20 08:09





 09/01/20 08:09





Hospitalist ROS





- Medication


Medications: 


Active Medications











Generic Name Dose Route Start Last Admin





  Trade Name Freq  PRN Reason Stop Dose Admin


 


Albuterol/Ipratropium  3 ml  08/31/20 14:30  09/01/20 14:21





  Duoneb  NEB   3 ml





  [id number] KESHIA   Administration





     





     





     





     


 


Budesonide  0.5 mg  08/31/20 18:30  09/01/20 08:41





  Pulmicort Neb Solution  INH   0.5 mg





  BID-RT KESHIA   Administration





     





     





     





     


 


Fondaparinux  7.5 mg  09/01/20 09:00  09/01/20 10:28





  Arixtra  SC   7.5 mg





  DAILY KESHIA   Administration





     





     





     





     


 


Furosemide  40 mg  09/01/20 09:00  09/01/20 10:28





  Lasix  SLOW IVP   40 mg





  DAILY KESHIA   Administration





     





     





     





     


 


Cefepime HCl 1 gm/ Sodium  100 mls @ 200 mls/hr  08/31/20 14:00  09/01/20 01:01





  Chloride  IVPB   100 mls





  0200,1400 KESHIA   Administration





     





     





     





     


 


Megestrol Acetate  400 mg  09/01/20 09:00  09/01/20 10:29





  Megace  PO   400 mg





  DAILY KESHIA   Administration





     





     





     





     


 


Methylprednisolone Sodium Succinate  40 mg  08/31/20 21:00  09/01/20 10:29





  Solu-Medrol  IVP   40 mg





  BID KESHIA   Administration





     





     





     





     


 


Pantoprazole Sodium  40 mg  09/01/20 09:00  09/01/20 10:31





  Protonix  PO   40 mg





  DAILY KESHIA   Administration





     





     





     





     


 


Senna/Docusate Sodium  2 tab  08/31/20 12:58  09/01/20 10:29





  Senokot S  PO   2 tab





  BIDPRN PRN   Administration





  Constipation   





     





     





     


 


Sertraline HCl  50 mg  09/01/20 09:00  09/01/20 10:30





  Zoloft  PO   50 mg





  DAILY KESHIA   Administration





     





     





     





     














- Exam


General Appearance: NAD


Eye: PERRL


ENT: normocephalic atraumatic


Neck: supple, symmetric


Heart: RRR


Respiratory: normal chest expansion, rhonchi


Gastrointestinal: soft, non-tender


Extremities: no cyanosis


Skin: normal turgor


Neurological: cranial nerve grossly intact





Hosp A/P





- Plan





#Acute hypoxic respiratory failure - likely mulifactorial including PNA, 

pneumonitis, chronic elevated diagram. CTA neg for PE


#Pneumonitis - unclear etiology, infectious etiology vs. chemotherapy related 


#Sepis - POA, secondary to PNA/E coli UTI


#Anemia of chronic disease - secondary to chemotherapy, underlying malignancy 


#Large B cell lymphoma  


#E coli UTI 


#Hypotension - resolved. 





9/1/20


Pt has been typed and crossed 2 units. Will give her a dose of diuretic in 

between units. 


BP improved with intravascular expansion with colloids


cont IV steroid, and empiric IV abx for probable PNA and E coli UTI


Follow final UCx and blood cultures. 


Appreciate specialists' input.

## 2020-09-02 NOTE — CON
DATE OF CONSULTATION:  



REASON FOR CONSULT:  Lymphoma.



HISTORY OF PRESENT ILLNESS:  Ms. Fair is a pleasant 74-year-old female with

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

lymphadenopathy.  She has activated B-cell subtype with no evidence of double hit on

FISH.  She presented to our clinic for treatment yesterday on 08/31/2020.  She was

hypoxic with shortness of breath.  She had back pain and left acutely ill.  She was

directly admitted to this hospital for hypoxia.  Her chest x-ray in the ER showed

worsening multifocal interstitial lung markings representing atypical infiltrate or

interstitial lung disease.  She had stable elevation of her left hemidiaphragm.  She

underwent a CT angio of the chest, which was negative for PE.  Again it showed areas

of interstitial thickening and ground-glass opacities, which had increased from the

prior exam, possibly related to pneumonitis.  Her prior mediastinal lymphadenopathy

had resolved.  She was mildly hypotensive on admission and transferred to the [facility],

where she received albumin and IV fluids.  She is on oxygen and feeling much better.

 Ms. Fair was diagnosed with lymphoma in May of this year.  She has received

four cycles of R-CHOP chemotherapy.  Her last dose was on August 14.  She is a

patient, who we share with MD Whitley.  Muscular scanning takes place in [place].

She has received IV methotrexate with treatment.  This was held for cycle 4.  She

was seen at bedside with her daughter present.  She denies any complaints and states

she is feeling weak, but otherwise okay. 



PAST MEDICAL HISTORY:  

1. Stage 3 high-grade B-cell lymphoma.

2. History of left lower extremity DVT.

3. Anxiety and depression.

4. History of neutropenic fever with hospitalization after cycle one.

5. Rheumatoid arthritis.

6. Hypertension.



PAST SURGICAL HISTORY:  

1. Hysterectomy.

2. Lymph node excision.

3. MediPort placement.



ALLERGIES:  NO KNOWN DRUG ALLERGIES.



HOME MEDICATIONS:  

1. Alprazolam.

2. Plavix.

3. Claritin.

4. Losartan.

5. Arixtra.

6. Zyrtec.

7. Megestrol.

8. Norco.

9. Protonix.

10. Potassium.

11. Prednisone.

12. Zoloft.



FAMILY HISTORY:  Daughter had cervical cancer.



SOCIAL HISTORY:  , has 4 children.  No alcohol, tobacco, or illicit drug use.



REVIEW OF SYSTEMS:  A 10-point review of systems is negative except for noted in HPI.



PHYSICAL EXAMINATION:

VITAL SIGNS:  Temperature is 98.4, pulse is 93, respiratory rate is 32, and BP is

120/65.  She is 94% on 3 L. 

GENERAL:  A well-developed, well-nourished female, in no acute distress. 

HEENT:  Normocephalic and atraumatic.  Pupils are equal and reactive to light. 

NECK:  Supple. 

CV:  Regular rate and rhythm. 

LUNGS:  She has crackles throughout. 

ABDOMEN:  Soft and nontender.  Bowel sounds are positive. 

EXTREMITIES:  No clubbing or cyanosis. 

SKIN:  No rash. 

HEMATOLOGIC:  No petechiae or purpura. 

NEUROLOGIC:  Nonfocal.



PERTINENT LABORATORY DATA AND X-RAYS:  Current WBCs are 4.9, hemoglobin 6.1,

hematocrit 18.7, and platelet count is 134,000.  She has 80% neutrophils, 6% bands,

and 10% lymphocytes.  Sodium 142, potassium 3.1, chloride 113, CO2 of 16, BUN is 19,

creatinine 0.96, calcium 7.9, magnesium 1.3, bilirubin 0.7, AST is 22, ALT is 16,

and alkaline phosphatase is 114.  Troponin 0.038 and BNP is 147.  Serum total

protein 4.8, albumin 2.6, and globulin 2.2.  1+ bacteria with positive leukocyte

esterase on urine.  COVID-19 PCR is negative.  Radiology per HPI. 



ASSESSMENT:  

1. Acute hypoxic respiratory failure.

2. Large B-cell lymphoma, on chemotherapy.

3. Anemia.



DISCUSSION:  The patient's hemoglobin is dropped to 6.0 today.  She is receiving 2

units of packed RBCs.  She remains on antibiotics and O2.  Dr. Trammell with

Pulmonology was consulted for his opinion regarding possible pneumonitis pneumonia.

She remains in the IMCU at this time, but hopefully will improve and be able to go

to a normal room shortly person.  We will continue to monitor her CBC daily and

provide supportive care.  Her chemotherapy will be held until she has recovered from

this acute illness. 



Thank you for the consult.







Job ID:  [id number]

## 2020-09-02 NOTE — PRG
DATE OF SERVICE:  09/02/2020



SUBJECTIVE:  The patient feels better today.  Had no acute complaints.



OBJECTIVE:  VITAL SIGNS:  Temperature 97.8, pulse 88, and O2 saturation 97% on 3 L. 

HEENT:  Unremarkable. 

NECK:  No JVD. 

LUNGS:  Inspiratory crackles bilaterally. 

CARDIAC:  S1 and S2, regular. 

ABDOMEN:  Soft. 

EXTREMITIES:  No edema.



LABORATORY DATA:  White blood cell count 10.2, hematocrit 26.8, and platelet count

160.  Sodium 144, potassium 3.5, chloride 113, CO2 of 20, BUN 20, creatinine 0.9,

and glucose 144. 



ASSESSMENT:  Bilateral interstitial infiltrates, likely due to either nonspecific

interstitial pneumonitis, atypical infection, or drug effects of the chemotherapy. 



PLAN:  She seems to be responding to current antibiotic and steroid regimen.  She

has stable transfer out to the Oncology unit.  I would continue antibiotics for 7 to

10 days.  I will recheck an x-ray tomorrow.  Bronchoscopy will be reserved for a

situation which her current condition worsens. 







Job ID:  [id number]

## 2020-09-02 NOTE — PDOC.MOPN
Interval History: 





feels well, complains of constipation





- Vital Signs


Vital Signs: 


 Vital Signs (12 hours)











  Temp Pulse Resp Pulse Ox


 


 09/02/20 08:00     96


 


 09/02/20 07:27  97.8 F   


 


 09/02/20 06:49   88  22 H  96


 


 09/02/20 04:00  98.8 F   


 


 09/02/20 02:14   85  28 H 


 


 09/02/20 01:04  98.6 F   


 


 09/01/20 23:48  98.9 F   








 Weight











Admit Weight                   124 lb 14.4 oz


 


Weight                         124 lb 14.4 oz











 Most Recent Monitor Data











Heart Rate from ECG            96


 


NIBP                           144/89


 


NIBP BP-Mean                   107


 


Respiration from ECG           22


 


SpO2                           100

















- Physical Exam


General: Alert, Oriented x3, No acute distress


HEENT: Atraumatic, PERRLA, EOMI, Mucous membr. moist/pink


Lungs: Other (crackles)


Cardiovascular: Regular rate


Abdomen: Normal bowel sounds


Extremities: No clubbing, No cyanosis, No edema, Normal pulses, No tenderness/

swelling


Neurological: Normal speech





- Labs


Result Diagrams: 


 09/02/20 03:45





 09/02/20 03:45


Lab results: 


 Laboratory Results - last 24 hr





09/02/20 03:45: WBC 10.2, RBC 2.98 L, Hgb 9.2 L, Hct 26.8 L, MCV 89.8, MCH 30.8

, MCHC 34.3, RDW 16.0 H, Plt Count 160, MPV 8.1, Neutrophils % 85.3 H, 

Lymphocytes % 10.1 L, Monocytes % 3.8, Eosinophils % 0.7, Basophils % 0.1, 

Neutrophils # 8.7 H, Lymphocytes # 1.0 L, Monocytes # 0.4, Eosinophils # 0.1, 

Basophils # 0.0


09/02/20 03:45: Sodium 144, Potassium 3.5, Chloride 113 H, Carbon Dioxide 20 L, 

Anion Gap 15, BUN 20, Creatinine 0.94, Estimated GFR (MDRD) 58, Glucose 144 H, 

Calcium 8.1


09/01/20 10:53: Blood Type A POSITIVE, Antibody Screen Cancelled, Ab Screen 

Tube Method NEGATIVE, Crossmatch See Detail


08/31/20 15:00: COVID-19 PCR Not Detected








Status: lab reviewed by me





A/P





- Problem


(1) Acute respiratory disorder in immunocompromised patient


Current Visit: Yes   Code(s): J06.9 - ACUTE UPPER RESPIRATORY INFECTION, 

UNSPECIFIED; D89.9 - DISORDER INVOLVING THE IMMUNE MECHANISM, UNSPECIFIED   

Status: Acute   





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


Current Visit: No   Code(s): C83.31 - DIFFUSE LARGE B-CELL LYMPHOMA, NODES OF 

HEAD, FACE, AND NECK   Status: Acute   





- Plan


Plan: 





continue abx, steroids per Pulmonary


add colace, miralax for constipation


transferring to Onc floor.


home soon

## 2020-09-02 NOTE — PDOC.HOSPP
- Subjective


Subjective: 





still sob but improved. pt transferred out of IM. Hb improved appropriately 

with transfusions. 


E coli sensitivity reviewed. 





- Objective


Vital Signs & Weight: 


 Vital Signs (12 hours)











  Temp Pulse Resp BP Pulse Ox Pulse Ox Pulse Ox


 


 09/02/20 14:17   98  20   93 L  


 


 09/02/20 13:05      94 L  


 


 09/02/20 11:42  97.6 F  113 H  20  187/89 H  94 L  


 


 09/02/20 11:05   89  23 H   95  


 


 09/02/20 10:25       98  94 L


 


 09/02/20 08:00      96  


 


 09/02/20 07:27  97.8 F      


 


 09/02/20 06:49   88  22 H   96  


 


 09/02/20 04:00  98.8 F      














  Pulse Ox


 


 09/02/20 14:17 


 


 09/02/20 13:05 


 


 09/02/20 11:42 


 


 09/02/20 11:05 


 


 09/02/20 10:25  93 L


 


 09/02/20 08:00 


 


 09/02/20 07:27 


 


 09/02/20 06:49 


 


 09/02/20 04:00 








 Weight











Admit Weight                   124 lb 14.4 oz


 


Weight                         124 lb 14.4 oz











 Most Recent Monitor Data











Heart Rate from ECG            96


 


NIBP                           144/89


 


NIBP BP-Mean                   107


 


Respiration from ECG           22


 


SpO2                           100














I&O: 


 











 09/01/20 09/02/20 09/03/20





 06:59 06:59 06:59


 


Intake Total 1010 2240 


 


Output Total 725 2000 


 


Balance 285 240 











Result Diagrams: 


 09/02/20 03:45





 09/02/20 03:45





Hospitalist ROS





- Medication


Medications: 


Active Medications











Generic Name Dose Route Start Last Admin





  Trade Name Freq  PRN Reason Stop Dose Admin


 


Hydrocodone Bitart/Acetaminophen  1 tab  08/31/20 12:58  09/01/20 20:53





  Norco 5/325  PO   1 tab





  Q4H PRN   Administration





  Moderate Pain (4-6)   





     





     





     


 


Albuterol/Ipratropium  3 ml  08/31/20 14:30  09/02/20 14:17





  Duoneb  NEB   3 ml





  [id number] KESHIA   Administration





     





     





     





     


 


Alprazolam  0.25 mg  08/31/20 16:19  09/02/20 02:22





  Xanax  PO   0.25 mg





  TID PRN   Administration





  Anxiety   





     





     





     


 


Budesonide  0.5 mg  08/31/20 18:30  09/02/20 06:49





  Pulmicort Neb Solution  INH   0.5 mg





  BID-RT KESHIA   Administration





     





     





     





     


 


Fondaparinux  7.5 mg  09/01/20 09:00  09/02/20 12:02





  Arixtra  SC   7.5 mg





  DAILY KESHIA   Administration





     





     





     





     


 


Furosemide  40 mg  09/01/20 09:00  09/02/20 08:41





  Lasix  SLOW IVP   40 mg





  DAILY KESHIA   Administration





     





     





     





     


 


Cefepime HCl 1 gm/ Sodium  100 mls @ 200 mls/hr  08/31/20 14:00  09/02/20 02:26





  Chloride  IVPB   100 mls





  0200,1400 KESHIA   Administration





     





     





     





     


 


Vancomycin HCl 1 gm/ Device  200 mls @ 200 mls/hr  09/01/20 15:00  09/01/20 17:

00





  IVPB   200 mls





  1500 KESHIA   Administration





     





     





     





     


 


Megestrol Acetate  400 mg  09/01/20 09:00  09/02/20 08:41





  Megace  PO   400 mg





  DAILY KESHIA   Administration





     





     





     





     


 


Methylprednisolone Sodium Succinate  40 mg  08/31/20 21:00  09/02/20 08:41





  Solu-Medrol  IVP   40 mg





  BID KESHIA   Administration





     





     





     





     


 


Pantoprazole Sodium  40 mg  09/01/20 09:00  09/02/20 08:41





  Protonix  PO   40 mg





  DAILY KESHIA   Administration





     





     





     





     


 


Senna/Docusate Sodium  2 tab  08/31/20 12:58  09/01/20 18:10





  Senokot S  PO   2 tab





  BIDPRN PRN   Administration





  Constipation   





     





     





     


 


Sertraline HCl  50 mg  09/01/20 09:00  09/02/20 08:41





  Zoloft  PO   50 mg





  DAILY KESHIA   Administration





     





     





     





     














Hosp A/P





- Plan





#Acute hypoxic respiratory failure - likely mulifactorial including PNA, 

pneumonitis, chronic elevated diagram. CTA neg for PE


#Pneumonitis - unclear etiology, infectious etiology vs. chemotherapy related 


#Sepis - POA, secondary to PNA/E coli UTI


#Anemia of chronic disease - secondary to chemotherapy, underlying malignancy 


#Large B cell lymphoma  


#E coli UTI 


#Hypotension - resolved. 








9/2/20


Clinically improving. Hb stable. Wean O2 as tolerated. Cont IV steroid, 

consider de-escalate IV abx tomorrow if cont to improve. 


cont supportive cares. PT eval. Specialists are following, appreciate input. 

Rpt labs in AM 





9/1/20


Pt has been typed and crossed 2 units. Will give her a dose of diuretic in 

between units. 


BP improved with intravascular expansion with colloids


cont IV steroid, and empiric IV abx for probable PNA and E coli UTI


Follow final UCx and blood cultures. 


Appreciate specialists' input.

## 2020-09-03 NOTE — RAD
EXAM: Single view of the chest



HISTORY:   Pneumonia



COMPARISON: 8/31/2020



FINDINGS: Single view of the chest shows an enlarged but stable cardiomediastinal silhouette.  The Me
diport is unchanged in position.  There are diffuse stable mixed multifocal infiltrates in the

lungs. No acute osseous abnormality.



IMPRESSION: Stable multifocal infiltrates



Reported By: Robert Beal 

Electronically Signed:  9/3/2020 7:54 AM

## 2020-09-03 NOTE — PDOC.HOSPP
- Subjective


Subjective: 





Patient was seen examined at bedside.  No acute events overnight.  Patient 

still complains short of breath.  Discussed with pulmonology.  Appreciate 

recommendation.  Her hemoglobin remained stable after blood transfusion.  She 

is to require 3 L satting in the low 90%.  Chest x-ray reviewed.  Electrolyte 

corrected.





- Objective


Vital Signs & Weight: 


 Vital Signs (12 hours)











  Temp Pulse Resp BP Pulse Ox Pulse Ox Pulse Ox


 


 09/03/20 14:47   96  16   94 L  


 


 09/03/20 10:45   105 H  20   90 L  


 


 09/03/20 10:30       90 L  88 L


 


 09/03/20 08:57  97.2 F L  100  24 H  134/89  92 L  


 


 09/03/20 07:43      93 L  


 


 09/03/20 07:41   98  20   93 L  


 


 09/03/20 04:00  97.9 F  94  22 H  148/80 H  94 L  














  Pulse Ox


 


 09/03/20 14:47 


 


 09/03/20 10:45 


 


 09/03/20 10:30  92 L


 


 09/03/20 08:57 


 


 09/03/20 07:43 


 


 09/03/20 07:41 


 


 09/03/20 04:00 








 Weight











Admit Weight                   124 lb 14.4 oz


 


Weight                         124 lb 14.4 oz











 Most Recent Monitor Data











Heart Rate from ECG            96


 


NIBP                           144/89


 


NIBP BP-Mean                   107


 


Respiration from ECG           22


 


SpO2                           100














I&O: 


 











 09/02/20 09/03/20 09/04/20





 06:59 06:59 06:59


 


Intake Total 2240 800 


 


Output Total 2000  


 


Balance 240 800 











Result Diagrams: 


 09/03/20 04:26





 09/03/20 04:26





Hospitalist ROS





- Medication


Medications: 


Active Medications











Generic Name Dose Route Start Last Admin





  Trade Name Freq  PRN Reason Stop Dose Admin


 


Hydrocodone Bitart/Acetaminophen  1 tab  08/31/20 12:58  09/01/20 20:53





  Norco 5/325  PO   1 tab





  Q4H PRN   Administration





  Moderate Pain (4-6)   





     





     





     


 


Albuterol/Ipratropium  3 ml  08/31/20 14:30  09/03/20 14:47





  Duoneb  NEB   3 ml





  [id number] KESHIA   Administration





     





     





     





     


 


Alprazolam  0.25 mg  08/31/20 16:19  09/02/20 20:37





  Xanax  PO   0.25 mg





  TID PRN   Administration





  Anxiety   





     





     





     


 


Budesonide  0.5 mg  08/31/20 18:30  09/03/20 07:43





  Pulmicort Neb Solution  INH   0.5 mg





  BID-RT KESHIA   Administration





     





     





     





     


 


Docusate Sodium  100 mg  09/02/20 21:00  09/03/20 08:52





  Colace  PO   100 mg





  BID KESHIA   Administration





     





     





     





     


 


Fondaparinux  7.5 mg  09/01/20 09:00  09/03/20 08:53





  Arixtra  SC   7.5 mg





  DAILY KESHIA   Administration





     





     





     





     


 


Furosemide  40 mg  09/01/20 09:00  09/03/20 08:53





  Lasix  SLOW IVP   40 mg





  DAILY KESHIA   Administration





     





     





     





     


 


Cefepime HCl 1 gm/ Sodium  100 mls @ 200 mls/hr  09/03/20 04:00  09/03/20 04:15





  Chloride  IVPB   100 mls





  0400,1600 KESHIA   Administration





     





     





     





     


 


Megestrol Acetate  400 mg  09/01/20 09:00  09/03/20 08:52





  Megace  PO   400 mg





  DAILY KESHIA   Administration





     





     





     





     


 


Methylprednisolone Sodium Succinate  40 mg  08/31/20 21:00  09/03/20 08:53





  Solu-Medrol  IVP   40 mg





  BID KESHIA   Administration





     





     





     





     


 


Pantoprazole Sodium  40 mg  09/01/20 09:00  09/03/20 08:52





  Protonix  PO   40 mg





  DAILY KESHIA   Administration





     





     





     





     


 


Polyethylene Glycol  17 gm  09/02/20 10:48  09/02/20 16:33





  Miralax  PO   17 gm





  DAILYPRN PRN   Administration





  Constipation   





     





     





     


 


Senna/Docusate Sodium  2 tab  08/31/20 12:58  09/03/20 08:52





  Senokot S  PO   2 tab





  BIDPRN PRN   Administration





  Constipation   





     





     





     


 


Sertraline HCl  50 mg  09/01/20 09:00  09/03/20 08:52





  Zoloft  PO   50 mg





  DAILY KESHIA   Administration





     





     





     





     














- Exam


General Appearance: NAD


Eye: PERRL, anicteric sclera


ENT: normocephalic atraumatic


Neck: supple, symmetric, no JVD


Heart: RRR, no murmur


Respiratory: rhonchi


Gastrointestinal: soft, non-tender


Extremities: no cyanosis, no clubbing, no edema


Skin: normal turgor, no lesions


Neurological: cranial nerve grossly intact


Musculoskeletal: normal tone, normal strength


Psychiatric: normal affect, normal behavior, A&O x 3





Hosp A/P





- Plan





#Acute hypoxic respiratory failure - likely mulifactorial including PNA, 

pneumonitis, chronic elevated diagram. CTA neg for PE


#Pneumonitis - unclear etiology, infectious etiology vs. chemotherapy related 


#Sepis - POA, secondary to PNA/E coli UTI


#Anemia of chronic disease - secondary to chemotherapy, underlying malignancy. s

/p transfusions 


#Large B cell lymphoma  


#E coli UTI 


#Hypotension - resolved. 





9/3/20


Patient's still dyspneic.  She is currently is on Solu-Medrol twice daily, and 

IV antibiotics.  Slow to progress.  Pulmonology and oncology are following.  

Her hemoglobin remained stable after transfusion.  Sensitivity for urine 

cultures reviewed.  We will check a.m. lab including sed rate/CRP.  Will check 

respiratory culture, and serologies including strep pneumoniae, legionella. 

Continue supportive cares. 





9/2/20


Clinically improving. Hb stable. Wean O2 as tolerated. Cont IV steroid, 

consider de-escalate IV abx tomorrow if cont to improve. 


cont supportive cares. PT eval. Specialists are following, appreciate input. 

Rpt labs in AM 





9/1/20


Pt has been typed and crossed 2 units. Will give her a dose of diuretic in 

between units. 


BP improved with intravascular expansion with colloids


cont IV steroid, and empiric IV abx for probable PNA and E coli UTI


Follow final UCx and blood cultures. 


Appreciate specialists' input.

## 2020-09-03 NOTE — PRG
DATE OF SERVICE:  



SUBJECTIVE:  She is about the same.  Continue on 3 L nasal cannula.



OBJECTIVE:  VITAL SIGNS:  O2 saturations about 93%, pulse 98, temperature 97.9,

blood pressure 148/80. 

HEENT:  Unremarkable. 

NECK:  No JVD. 

LUNGS:  Inspiratory crackles at the bases. 

CARDIAC:  S1, S2.  Regular. 

ABDOMEN:  Soft. 

EXTREMITIES:  No edema.



LABORATORY DATA:  Sodium 146, BUN 26, creatinine 0.8, glucose 131.  White blood cell

count 11.7, hematocrit 31, and platelet count 182.  Chest x-ray looks about the

same. 



ASSESSMENT:  Slowly improving pneumonia-no organism identified.  Changes could also

be due to nonspecific interstitial pneumonitis or the chemotherapeutic agents. 



PLAN:  Continue steroids, antibiotics, and give her some time.







Job ID:  [id number]

## 2020-09-03 NOTE — PDOC.MOPN
Interval History: 





sitting on side of bed eating breakfast, feeling much better.





- Vital Signs


Vital Signs: 


 Vital Signs (12 hours)











  Temp Pulse Resp BP Pulse Ox


 


 09/03/20 08:57  97.2 F L  100  24 H  134/89  92 L


 


 09/03/20 07:43      93 L


 


 09/03/20 07:41   98  20   93 L


 


 09/03/20 04:00  97.9 F  94  22 H  148/80 H  94 L


 


 09/03/20 01:43   94  20   95








 Weight











Admit Weight                   124 lb 14.4 oz


 


Weight                         124 lb 14.4 oz











 Most Recent Monitor Data











Heart Rate from ECG            96


 


NIBP                           144/89


 


NIBP BP-Mean                   107


 


Respiration from ECG           22


 


SpO2                           100

















- Physical Exam


General: Alert, Oriented x3, No acute distress


HEENT: Atraumatic, PERRLA, EOMI, Mucous membr. moist/pink


Lungs: Other (crackles)


Cardiovascular: Regular rate, Normal S1, Normal S2, No murmurs, Gallops, Rubs


Abdomen: Normal bowel sounds, Soft, No tenderness, No hepatospenomegaly, No 

masses


Neurological: Normal gait, Normal speech, Strength at 5/5 X4 ext, Normal tone, 

Sensation intact, Cranial nerves 3-12 NL, Reflexes 2+


Psych/Mental Status: Mental status NL, Mood NL





- Labs


Result Diagrams: 


 09/03/20 04:26





 09/03/20 04:26


Lab results: 


 Laboratory Results - last 24 hr





09/03/20 04:26: WBC 11.7 H, RBC 3.43 L, Hgb 10.1 L, Hct 31.1 L, MCV 90.6, MCH 

29.5, MCHC 32.5, RDW 16.7 H, Plt Count 182, MPV 8.0, Neutrophils % 84.2 H, 

Lymphocytes % 9.6 L, Monocytes % 5.6, Eosinophils % 0.6, Basophils % 0.1, 

Neutrophils # 9.9 H, Lymphocytes # 1.1 L, Monocytes # 0.7 H, Eosinophils # 0.1, 

Basophils # 0.0


09/03/20 04:26: Sodium 146 H, Potassium 3.7, Chloride 112 H, Carbon Dioxide 22 L

, Anion Gap 16, BUN 26 H, Creatinine 0.81, Estimated GFR (MDRD) 69, Glucose 131 

H, Calcium 8.5, Magnesium 2.0


09/02/20 15:52: Vancomycin Trough 13.6








Status: lab reviewed by me





A/P





- Problem


(1) Acute respiratory disorder in immunocompromised patient


Current Visit: Yes   Code(s): J06.9 - ACUTE UPPER RESPIRATORY INFECTION, 

UNSPECIFIED; D89.9 - DISORDER INVOLVING THE IMMUNE MECHANISM, UNSPECIFIED   

Status: Acute   





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


Current Visit: No   Code(s): C83.31 - DIFFUSE LARGE B-CELL LYMPHOMA, NODES OF 

HEAD, FACE, AND NECK   Status: Acute   





- Plan


Plan: 





continue steroids, abx, O2


supportive care

## 2020-09-04 NOTE — PDOC.HOSPP
- Subjective


Subjective: 





Patient was seen examined at bedside.  Patient is progressing well.  Her oxygen 

has weaned down to 2 and half satting in the mid 90%.  Patient report that her 

breathing is improved.  Her renal function stable as well as her hemoglobin.  

Blood cultures no growth.  Patient currently is on empiric IV antibiotic and 

steroid for her pneumonitis.  Pulmonology's and oncology are following.





- Objective


Vital Signs & Weight: 


 Vital Signs (12 hours)











  Temp Pulse Resp BP Pulse Ox


 


 09/04/20 16:33   96  16  


 


 09/04/20 10:17   96  12  


 


 09/04/20 09:15      95


 


 09/04/20 08:00  98.3 F  99  20  146/87 H  99








 Weight











Admit Weight                   124 lb 14.4 oz


 


Weight                         124 lb 14.4 oz











 Most Recent Monitor Data











Heart Rate from ECG            96


 


NIBP                           144/89


 


NIBP BP-Mean                   107


 


Respiration from ECG           22


 


SpO2                           100














I&O: 


 











 09/03/20 09/04/20 09/05/20





 06:59 06:59 06:59


 


Intake Total 800  


 


Balance 800  











Result Diagrams: 


 09/04/20 04:50





 09/04/20 04:50





Hospitalist ROS





- Medication


Medications: 


Active Medications











Generic Name Dose Route Start Last Admin





  Trade Name Freq  PRN Reason Stop Dose Admin


 


Hydrocodone Bitart/Acetaminophen  1 tab  08/31/20 12:58  09/01/20 20:53





  Norco 5/325  PO   1 tab





  Q4H PRN   Administration





  Moderate Pain (4-6)   





     





     





     


 


Albuterol/Ipratropium  3 ml  08/31/20 14:30  09/04/20 16:33





  Duoneb  NEB   3 ml





  [id number] KESHIA   Administration





     





     





     





     


 


Budesonide  0.5 mg  08/31/20 18:30  09/04/20 10:17





  Pulmicort Neb Solution  INH   0.5 mg





  BID-RT KESHIA   Administration





     





     





     





     


 


Docusate Sodium  100 mg  09/02/20 21:00  09/04/20 09:16





  Colace  PO   100 mg





  BID KESHIA   Administration





     





     





     





     


 


Fondaparinux  7.5 mg  09/01/20 09:00  09/04/20 10:01





  Arixtra  SC   7.5 mg





  DAILY KESHIA   Administration





     





     





     





     


 


Furosemide  40 mg  09/01/20 09:00  09/04/20 09:16





  Lasix  SLOW IVP   40 mg





  DAILY KESHIA   Administration





     





     





     





     


 


Cefepime HCl 1 gm/ Sodium  100 mls @ 200 mls/hr  09/03/20 04:00  09/04/20 15:30





  Chloride  IVPB   100 mls





  0400,1600 KESHIA   Administration





     





     





     





     


 


Vancomycin HCl 1.25 gm/ Sodium  250 mls @ 166.667 mls/hr  09/03/20 17:00  09/04/ 20 16:07





  Chloride  IVPB   250 mls





  1700 KESHIA   Administration





     





     





     





     


 


Megestrol Acetate  400 mg  09/01/20 09:00  09/04/20 09:16





  Megace  PO   400 mg





  DAILY KESHIA   Administration





     





     





     





     


 


Methylprednisolone Sodium Succinate  40 mg  08/31/20 21:00  09/04/20 09:16





  Solu-Medrol  IVP   40 mg





  BID KESHIA   Administration





     





     





     





     


 


Pantoprazole Sodium  40 mg  09/01/20 09:00  09/04/20 09:16





  Protonix  PO   40 mg





  DAILY KESHIA   Administration





     





     





     





     


 


Polyethylene Glycol  17 gm  09/02/20 10:48  09/04/20 09:16





  Miralax  PO   17 gm





  DAILYPRN PRN   Administration





  Constipation   





     





     





     


 


Senna/Docusate Sodium  2 tab  08/31/20 12:58  09/03/20 08:52





  Senokot S  PO   2 tab





  BIDPRN PRN   Administration





  Constipation   





     





     





     


 


Sertraline HCl  50 mg  09/01/20 09:00  09/04/20 09:16





  Zoloft  PO   50 mg





  DAILY KESHIA   Administration





     





     





     





     














- Exam


General Appearance: NAD, awake alert


Eye: PERRL, anicteric sclera


ENT: normocephalic atraumatic


Neck: supple, symmetric, no JVD


Heart: RRR, no murmur, no gallops


Respiratory: normal chest expansion, rhonchi


Gastrointestinal: soft, non-tender, non-distended


Extremities: no cyanosis, no edema


Skin: normal turgor


Neurological: cranial nerve grossly intact


Musculoskeletal: normal tone, normal strength


Psychiatric: normal affect, normal behavior, A&O x 3





Hosp A/P





- Plan





#Acute hypoxic respiratory failure - likely mulifactorial including PNA, 

pneumonitis, chronic elevated diagram. CTA neg for PE


#Pneumonitis - unclear etiology, infectious etiology vs. chemotherapy related 


#Sepis - POA, secondary to PNA/E coli UTI


#Anemia of chronic disease - secondary to chemotherapy, underlying malignancy. s

/p transfusions 


#Large B cell lymphoma  


#E coli UTI 


#Hypotension - resolved. 





9/4/20 


Clinically improving, slowly. Cont to wean O2 as tolerated. Supportive cares. 

Cont IV abx, and steroids. She likely will needs couple more days before ready 

for discharge. 





9/3/20


Patient's still dyspneic.  She is currently is on Solu-Medrol twice daily, and 

IV antibiotics.  Slow to progress.  Pulmonology and oncology are following.  

Her hemoglobin remained stable after transfusion.  Sensitivity for urine 

cultures reviewed.  We will check a.m. lab including sed rate/CRP.  Will check 

respiratory culture, and serologies including strep pneumoniae, legionella. 

Continue supportive cares. 





9/2/20


Clinically improving. Hb stable. Wean O2 as tolerated. Cont IV steroid, 

consider de-escalate IV abx tomorrow if cont to improve. 


cont supportive cares. PT eval. Specialists are following, appreciate input. 

Rpt labs in AM 





9/1/20


Pt has been typed and crossed 2 units. Will give her a dose of diuretic in 

between units. 


BP improved with intravascular expansion with colloids


cont IV steroid, and empiric IV abx for probable PNA and E coli UTI


Follow final UCx and blood cultures. 


Appreciate specialists' input.

## 2020-09-04 NOTE — PRG
DATE OF SERVICE:  09/04/2020



SUBJECTIVE:  The patient is doing reasonably well.  Says she is breathing better.



PHYSICAL EXAMINATION:

VITAL SIGNS:  O2 saturations 99% on 4 L, temperature 98.3, respirations 20. 

HEENT:  Unremarkable. 

NECK:  No JVD. 

LUNGS:  Inspiratory crackles toward the bases. 

CARDIAC:  S1, S2.  Regular. 

ABDOMEN:  Soft. 

EXTREMITIES:  No edema.



LABORATORY DATA:  White blood cell count 8.8, hematocrit 30, and platelet count 152.

 Sodium 143, potassium 3.3, BUN 25, creatinine __________, glucose 137. 



ASSESSMENT:  Pneumonia versus interstitial pneumonitis versus drug effect from the

chemotherapy. 



PLAN:  Continue the antibiotics and steroids.  Wean oxygen as tolerated.  Hopefully

home in a couple of days. 







Job ID:  [id number]

## 2020-09-04 NOTE — PDOC.MOPN
Interval History: 





breathing better. No pain, complaints.





- Vital Signs


Vital Signs: 


 Vital Signs (12 hours)











  Temp Pulse Resp BP Pulse Ox


 


 09/04/20 10:17   96  12  


 


 09/04/20 09:15      95


 


 09/04/20 08:00  98.3 F  99  20  146/87 H  99


 


 09/04/20 03:47      92 L








 Weight











Admit Weight                   124 lb 14.4 oz


 


Weight                         124 lb 14.4 oz











 Most Recent Monitor Data











Heart Rate from ECG            96


 


NIBP                           144/89


 


NIBP BP-Mean                   107


 


Respiration from ECG           22


 


SpO2                           100

















- Physical Exam


General: Alert, Oriented x3, No acute distress


HEENT: Atraumatic, PERRLA, EOMI, Mucous membr. moist/pink


Lungs: Clear to auscultation, Normal air movement


Cardiovascular: Regular rate, Normal S1, Normal S2, No murmurs, Gallops, Rubs


Abdomen: Normal bowel sounds, Soft, No tenderness, No hepatospenomegaly, No 

masses


Neurological: Normal gait, Normal speech, Strength at 5/5 X4 ext, Normal tone, 

Sensation intact, Cranial nerves 3-12 NL, Reflexes 2+





- Labs


Result Diagrams: 


 09/04/20 04:50





 09/04/20 04:50


Lab results: 


 Laboratory Results - last 24 hr





09/04/20 10:00: Ur Strep pneumoniae Ag NEGATIVE


09/04/20 04:50: ESR [place] [id number]


09/04/20 04:50: WBC 8.8, RBC 3.26 L, Hgb 10.0 L, Hct 30.0 L, MCV 92.1, MCH 30.5

, MCHC 33.1, RDW 16.5 H, Plt Count 152, MPV 8.3, Neutrophils % 82.9 H, 

Lymphocytes % 9.9 L, Monocytes % 6.4, Eosinophils % 0.8, Basophils % 0.1, 

Neutrophils # 7.3 H, Lymphocytes # 0.9 L, Monocytes # 0.6 H, Eosinophils # 0.1, 

Basophils # 0.0


09/04/20 04:50: Sodium 143, Potassium 3.3 L, Chloride 109 H, Carbon Dioxide 25, 

Anion Gap 12, BUN 25 H, Creatinine 0.78, Estimated GFR (MDRD) 72, Glucose 137 H

, Calcium 8.3, Total Bilirubin 0.7, AST 18, ALT 15, Alkaline Phosphatase 92, 

Serum Total Protein 5.3 L, Albumin 3.4, Globulin 1.9 L, Albumin/Globulin Ratio 

1.8








Status: lab reviewed by me





A/P





- Problem


(1) Acute respiratory disorder in immunocompromised patient


Current Visit: Yes   Code(s): J06.9 - ACUTE UPPER RESPIRATORY INFECTION, 

UNSPECIFIED; D89.9 - DISORDER INVOLVING THE IMMUNE MECHANISM, UNSPECIFIED   

Status: Acute   





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


Current Visit: No   Code(s): C83.31 - DIFFUSE LARGE B-CELL LYMPHOMA, NODES OF 

HEAD, FACE, AND NECK   Status: Acute   





- Plan


Plan: 





Continue abx, steroids.


Supportive care


chemo held until recovered.

## 2020-09-05 NOTE — PDOC.HOSPP
- Subjective


Encounter Date: 09/05/20


Encounter Time: 10:30


Subjective: 





pt up in bed no complains. she is on 2.5l of oxygen





- Objective


Vital Signs & Weight: 


 Vital Signs (12 hours)











  Temp Pulse Resp BP BP Pulse Ox


 


 09/05/20 11:12   88  16   


 


 09/05/20 09:24     127/84  


 


 09/05/20 08:00  98.5 F  115 H  22 H   124/70  99


 


 09/05/20 06:25   112 H  20   


 


 09/05/20 03:43   102 H    








 Weight











Admit Weight                   124 lb 14.4 oz


 


Weight                         124 lb 14.4 oz











 Most Recent Monitor Data











Heart Rate from ECG            96


 


NIBP                           144/89


 


NIBP BP-Mean                   107


 


Respiration from ECG           22


 


SpO2                           100














I&O: 


 











 09/04/20 09/05/20 09/06/20





 06:59 06:59 06:59


 


Intake Total  1850 


 


Balance  1850 











Result Diagrams: 


 09/05/20 04:07





 09/05/20 04:07





Hospitalist ROS





- Review of Systems


Respiratory: denies: cough, dry, shortness of breath, hemoptysis, SOB with 

excertion, pleuritic pain, sputum, wheezing, other


Cardiovascular: denies: chest pain, palpitations, orthopnea, paroxysmal noc. 

dyspnea, edema, light headedness, other


Gastrointestinal: denies: nausea, vomiting, abdominal pain, diarrhea, 

constipation, melena, hematochezia, other





- Medication


Medications: 


Active Medications











Generic Name Dose Route Start Last Admin





  Trade Name Freq  PRN Reason Stop Dose Admin


 


Albuterol/Ipratropium  3 ml  08/31/20 14:30  09/05/20 11:12





  Duoneb  NEB   3 ml





  [id number] KESHIA   Administration





     





     





     





     


 


Alprazolam  0.5 mg  09/04/20 12:19  09/04/20 20:50





  Xanax  PO   0.5 mg





  TIDPRN PRN   Administration





  Anxiety   





     





     





     


 


Budesonide  0.5 mg  08/31/20 18:30  09/05/20 06:25





  Pulmicort Neb Solution  INH   0.5 mg





  BID-RT KESHIA   Administration





     





     





     





     


 


Carvedilol  12.5 mg  09/05/20 09:00  09/05/20 09:24





  Coreg  PO   12.5 mg





  BID KESHIA   Administration





     





     





     





     


 


Docusate Sodium  100 mg  09/02/20 21:00  09/05/20 08:30





  Colace  PO   100 mg





  BID KESHIA   Administration





     





     





     





     


 


Fondaparinux  7.5 mg  09/01/20 09:00  09/05/20 08:30





  Arixtra  SC   7.5 mg





  DAILY KESHIA   Administration





     





     





     





     


 


Furosemide  40 mg  09/01/20 09:00  09/05/20 08:54





  Lasix  SLOW IVP   Not Given





  DAILY KESHIA   





     





     





     





     


 


Cefepime HCl 1 gm/ Sodium  100 mls @ 200 mls/hr  09/03/20 04:00  09/05/20 03:34





  Chloride  IVPB   100 mls





  0400,1600 KESHIA   Administration





     





     





     





     


 


Megestrol Acetate  400 mg  09/01/20 09:00  09/05/20 08:32





  Megace  PO   400 mg





  DAILY KESHIA   Administration





     





     





     





     


 


Methylprednisolone Sodium Succinate  40 mg  08/31/20 21:00  09/05/20 08:32





  Solu-Medrol  IVP   40 mg





  BID KESHIA   Administration





     





     





     





     


 


Pantoprazole Sodium  40 mg  09/01/20 09:00  09/05/20 08:32





  Protonix  PO   40 mg





  DAILY KESHIA   Administration





     





     





     





     


 


Polyethylene Glycol  17 gm  09/02/20 10:48  09/04/20 09:16





  Miralax  PO   17 gm





  DAILYPRN PRN   Administration





  Constipation   





     





     





     


 


Potassium Chloride  20 meq  09/05/20 08:00  09/05/20 08:29





  K-Dur  PO   20 meq





  QAM-WM KESHIA   Administration





     





     





     





     


 


Senna/Docusate Sodium  2 tab  08/31/20 12:58  09/03/20 08:52





  Senokot S  PO   2 tab





  BIDPRN PRN   Administration





  Constipation   





     





     





     


 


Sertraline HCl  50 mg  09/01/20 09:00  09/05/20 08:32





  Zoloft  PO   50 mg





  DAILY KESHIA   Administration





     





     





     





     














- Exam


Neck: negative: supple, symmetric, no JVD, no thyromegaly, no lymphadenopathy, 

no carotid bruit, JVD


Heart: negative: RRR, no murmur, no gallops, no rubs, normal peripheral pulses, 

irregular, diminshed peripheral pulses, murmur present, II/IV, III/IV


Respiratory: negative: CTAB, no wheezes, no rales, no ronchi, normal chest 

expansion, no tachypnea, normal percussion, rales, rhonchi, tachypneic, wheezes


Gastrointestinal: negative: soft, non-tender, non-distended, normal bowel sounds

, no palpable masses, no hepatomegaly, no splenomegaly, no bruit, no guarding, 

no rigidity, tender to palpation, distended, diminished bowl sounds, voluntary 

guarding





Hosp A/P


(1) SOB (shortness of breath)


Code(s): R06.02 - SHORTNESS OF BREATH   Status: Acute   





(2) Acute respiratory disorder in immunocompromised patient


Code(s): J06.9 - ACUTE UPPER RESPIRATORY INFECTION, UNSPECIFIED; D89.9 - 

DISORDER INVOLVING THE IMMUNE MECHANISM, UNSPECIFIED   Status: Acute   





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


Code(s): C83.31 - DIFFUSE LARGE B-CELL LYMPHOMA, NODES OF HEAD, FACE, AND NECK 

  Status: Acute   





- Plan





#Acute hypoxic respiratory failure - likely mulifactorial including PNA, 

pneumonitis, chronic elevated diagram. CTA neg for PE


#Pneumonitis - unclear etiology, infectious etiology vs. chemotherapy related 


#Sepis - POA, secondary to PNA/E coli UTI


#Anemia of chronic disease - secondary to chemotherapy, underlying malignancy. s

/p transfusions 


#Large B cell lymphoma  


#E coli UTI 


#Hypotension - resolved. 








9/5 pt on 2.5L of NC. she feels better will stop her vanco and will change 

steroids to po for shu. possible discharge in the next 25-48hr. coreg started. 





9/4/20 


Clinically improving, slowly. Cont to wean O2 as tolerated. Supportive cares. 

Cont IV abx, and steroids. She likely will needs couple more days before ready 

for discharge. 





9/3/20


Patient's still dyspneic.  She is currently is on Solu-Medrol twice daily, and 

IV antibiotics.  Slow to progress.  Pulmonology and oncology are following.  

Her hemoglobin remained stable after transfusion.  Sensitivity for urine 

cultures reviewed.  We will check a.m. lab including sed rate/CRP.  Will check 

respiratory culture, and serologies including strep pneumoniae, legionella. 

Continue supportive cares. 





9/2/20


Clinically improving. Hb stable. Wean O2 as tolerated. Cont IV steroid, 

consider de-escalate IV abx tomorrow if cont to improve. 


cont supportive cares. PT eval. Specialists are following, appreciate input. 

Rpt labs in AM 





9/1/20


Pt has been typed and crossed 2 units. Will give her a dose of diuretic in 

between units. 


BP improved with intravascular expansion with colloids


cont IV steroid, and empiric IV abx for probable PNA and E coli UTI


Follow final UCx and blood cultures. 


Appreciate specialists' input.

## 2020-09-06 NOTE — PDOC.HOSPP
- Subjective


Encounter Date: 09/06/20


Encounter Time: 12:30


Subjective: 





pt up in bed no complains. 





- Objective


Vital Signs & Weight: 


 Vital Signs (12 hours)











  Temp Pulse Resp BP BP Pulse Ox


 


 09/06/20 12:00  98.4 F  98  22 H   119/64  94 L


 


 09/06/20 11:00   105 H  20   


 


 09/06/20 09:29     127/84  


 


 09/06/20 08:00  98.4 F  105 H  22 H   122/66  98


 


 09/06/20 06:10   98  20   








 Weight











Admit Weight                   124 lb 14.4 oz


 


Weight                         124 lb 14.4 oz











 Most Recent Monitor Data











Heart Rate from ECG            96


 


NIBP                           144/89


 


NIBP BP-Mean                   107


 


Respiration from ECG           22


 


SpO2                           100














I&O: 


 











 09/05/20 09/06/20 09/07/20





 06:59 06:59 06:59


 


Intake Total 1850 1130 250


 


Balance 1850 1130 250











Result Diagrams: 


 09/05/20 04:07





 09/05/20 04:07





Hospitalist ROS





- Review of Systems


Cardiovascular: denies: chest pain, palpitations, orthopnea, paroxysmal noc. 

dyspnea, edema, light headedness, other


Gastrointestinal: denies: nausea, vomiting, abdominal pain, diarrhea, 

constipation, melena, hematochezia, other


Genitourinary: denies: dysuria, frequency, incontinence, hematuria, retention, 

other





- Medication


Medications: 


Active Medications











Generic Name Dose Route Start Last Admin





  Trade Name Freq  PRN Reason Stop Dose Admin


 


Albuterol/Ipratropium  3 ml  08/31/20 14:30  09/06/20 11:00





  Duoneb  NEB   3 ml





  [id number] KESHIA   Administration





     





     





     





     


 


Alprazolam  0.5 mg  09/04/20 12:19  09/06/20 09:40





  Xanax  PO   0.5 mg





  TIDPRN PRN   Administration





  Anxiety   





     





     





     


 


Budesonide  0.5 mg  08/31/20 18:30  09/06/20 06:10





  Pulmicort Neb Solution  INH   0.5 mg





  BID-RT KESHIA   Administration





     





     





     





     


 


Carvedilol  12.5 mg  09/05/20 09:00  09/06/20 09:29





  Coreg  PO   12.5 mg





  BID KESHIA   Administration





     





     





     





     


 


Docusate Sodium  100 mg  09/02/20 21:00  09/06/20 09:30





  Colace  PO   100 mg





  BID KESHIA   Administration





     





     





     





     


 


Fondaparinux  7.5 mg  09/01/20 09:00  09/06/20 09:31





  Arixtra  SC   7.5 mg





  DAILY KESHIA   Administration





     





     





     





     


 


Guaifenesin  600 mg  09/05/20 21:00  09/06/20 09:29





  Mucinex  PO   600 mg





  BID KESHIA   Administration





     





     





     





     


 


Megestrol Acetate  400 mg  09/01/20 09:00  09/06/20 09:25





  Megace  PO   400 mg





  DAILY KESHIA   Administration





     





     





     





     


 


Pantoprazole Sodium  40 mg  09/01/20 09:00  09/06/20 09:29





  Protonix  PO   40 mg





  DAILY KESHIA   Administration





     





     





     





     


 


Polyethylene Glycol  17 gm  09/02/20 10:48  09/04/20 09:16





  Miralax  PO   17 gm





  DAILYPRN PRN   Administration





  Constipation   





     





     





     


 


Potassium Chloride  20 meq  09/05/20 08:00  09/06/20 09:37





  K-Dur  PO   Not Given





  QAM-WM KESHIA   





     





     





     





     


 


Prednisone  40 mg  09/06/20 08:00  09/06/20 09:29





  Prednisone  PO   40 mg





  QAM-WM KESHIA   Administration





     





     





     





     


 


Senna/Docusate Sodium  2 tab  08/31/20 12:58  09/03/20 08:52





  Senokot S  PO   2 tab





  BIDPRN PRN   Administration





  Constipation   





     





     





     


 


Sertraline HCl  50 mg  09/01/20 09:00  09/06/20 09:30





  Zoloft  PO   50 mg





  DAILY KESHIA   Administration





     





     





     





     














- Exam


Neck: negative: supple, symmetric, no JVD, no thyromegaly, no lymphadenopathy, 

no carotid bruit, JVD


Heart: negative: RRR, no murmur, no gallops, no rubs, normal peripheral pulses, 

irregular, diminshed peripheral pulses, murmur present, II/IV, III/IV


Respiratory - other findings: crackles to right lower lung. 


Gastrointestinal: negative: soft, non-tender, non-distended, normal bowel sounds

, no palpable masses, no hepatomegaly, no splenomegaly, no bruit, no guarding, 

no rigidity, tender to palpation, distended, diminished bowl sounds, voluntary 

guarding





Hosp A/P


(1) SOB (shortness of breath)


Code(s): R06.02 - SHORTNESS OF BREATH   Status: Acute   





(2) Acute respiratory disorder in immunocompromised patient


Code(s): J06.9 - ACUTE UPPER RESPIRATORY INFECTION, UNSPECIFIED; D89.9 - 

DISORDER INVOLVING THE IMMUNE MECHANISM, UNSPECIFIED   Status: Acute   





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


Code(s): C83.31 - DIFFUSE LARGE B-CELL LYMPHOMA, NODES OF HEAD, FACE, AND NECK 

  Status: Acute   





- Plan





#Acute hypoxic respiratory failure - likely mulifactorial including PNA, 

pneumonitis, chronic elevated diagram. CTA neg for PE


#Pneumonitis - unclear etiology, infectious etiology vs. chemotherapy related 


#Sepis - POA, secondary to PNA/E coli UTI


#Anemia of chronic disease - secondary to chemotherapy, underlying malignancy. s

/p transfusions 


#Large B cell lymphoma  


#E coli UTI 


#Hypotension - resolved. 





9/6  will switch her iv abx to oral. will walk pt to see if she needs oxygen at 

home. possible discharge in am. 





9/5 pt on 2.5L of NC. she feels better will stop her vanco and will change 

steroids to po for shu. possible discharge in the next 25-48hr. coreg started. 





9/4/20 


Clinically improving, slowly. Cont to wean O2 as tolerated. Supportive cares. 

Cont IV abx, and steroids. She likely will needs couple more days before ready 

for discharge. 





9/3/20


Patient's still dyspneic.  She is currently is on Solu-Medrol twice daily, and 

IV antibiotics.  Slow to progress.  Pulmonology and oncology are following.  

Her hemoglobin remained stable after transfusion.  Sensitivity for urine 

cultures reviewed.  We will check a.m. lab including sed rate/CRP.  Will check 

respiratory culture, and serologies including strep pneumoniae, legionella. 

Continue supportive cares. 





9/2/20


Clinically improving. Hb stable. Wean O2 as tolerated. Cont IV steroid, 

consider de-escalate IV abx tomorrow if cont to improve. 


cont supportive cares. PT eval. Specialists are following, appreciate input. 

Rpt labs in AM 





9/1/20


Pt has been typed and crossed 2 units. Will give her a dose of diuretic in 

between units. 


BP improved with intravascular expansion with colloids


cont IV steroid, and empiric IV abx for probable PNA and E coli UTI


Follow final UCx and blood cultures. 


Appreciate specialists' input.

## 2020-09-06 NOTE — PRG
DATE OF SERVICE:  09/06/2020



SUBJECTIVE:  Kusum Fair says she is feeling better.  She has a little cough

today.  She __________ have humidity on her oxygen and __________ frequently.

Mucinex is being given.  We will add humidity to her oxygen. 



OBJECTIVE:  VITAL SIGNS:  She is afebrile.  Heart rate is 105, respiratory rate is

22, oximetry is 98%, blood pressure 123/66. 

LUNGS:  Clear. 

HEART:  Regular rhythm. 

ABDOMEN:  Soft.



LABORATORY DATA:  White count 7.9 yesterday.  Renal functions normal.  Creatinine

0.75 yesterday.  BNP was elevated at 775. 



Her  feels that she is 100% better than she was 3 to 4 days ago.



IMPRESSION:  Interstitial infiltrates on chest radiograph.  This could be secondary

to chemo, could be secondary to an infectious process with an elevated BNP, some of

this could be related to her heart.  Surprisingly, her cardiac function is normal by

echocardiogram on this admission. 



She is clinically improving.  We will continue to follow.







Job ID:  [id number]

## 2020-09-07 NOTE — PRG
DATE OF SERVICE:  09/07/2020



SUBJECTIVE:  The patient is sleeping in bed.  Her daughter is at the bedside.



OBJECTIVE:  VITAL SIGNS:  Her O2 saturations running in the low 90s on 2 L, pulse

99, blood pressure 127/84. 

HEENT:  Remarkable for alopecia. 

NECK:  No JVD. 

LUNGS:  She has wide mildly diffuse crackles best heard posteriorly. 

CARDIAC:  S1 and S2.  Regular. 

ABDOMEN:  Soft. 

EXTREMITIES:  No edema.



LABORATORY DATA:  No new labs obtained today.



ASSESSMENT:  Pneumonia versus interstitial pneumonitis versus drug effect for

chemotherapy. 



PLAN:  Continue steroids and antibiotics.  I will keep her in the hospital until at

least tomorrow.  She is going to need home oxygen at the time of discharge.  She

will need to see me in the office in 2 to 3 weeks after discharge. 







Job ID:  [id number]

## 2020-09-07 NOTE — PDOC.HOSPP
- Subjective


Encounter Date: 09/07/20


Encounter Time: 09:15


Subjective: 





Patient resting in bed with family at side with no complaints.





- Objective


Vital Signs & Weight: 


 Vital Signs (12 hours)











  Temp Pulse Resp BP BP Pulse Ox


 


 09/07/20 08:00  98.1 F  84  20   109/55 L  94 L


 


 09/07/20 07:29   89  18    94 L


 


 09/07/20 01:52   88  14    94 L


 


 09/06/20 22:47   96    115/68 


 


 09/06/20 22:31   101 H  14   


 


 09/06/20 21:25     127/84  








 Weight











Admit Weight                   124 lb 14.4 oz


 


Weight                         124 lb 14.4 oz











 Most Recent Monitor Data











Heart Rate from ECG            96


 


NIBP                           144/89


 


NIBP BP-Mean                   107


 


Respiration from ECG           22


 


SpO2                           100














I&O: 


 











 09/06/20 09/07/20 09/08/20





 06:59 06:59 06:59


 


Intake Total 1130 1390 


 


Balance 1130 1390 











Result Diagrams: 


 09/05/20 04:07





 09/05/20 04:07





Hospitalist ROS





- Review of Systems


Respiratory: reports: cough


Cardiovascular: denies: chest pain, palpitations, orthopnea, paroxysmal noc. 

dyspnea, edema, light headedness, other


Gastrointestinal: denies: nausea, vomiting, abdominal pain, diarrhea, 

constipation, melena, hematochezia, other


Genitourinary: denies: dysuria, frequency, incontinence, hematuria, retention, 

other





- Medication


Medications: 


Active Medications











Generic Name Dose Route Start Last Admin





  Trade Name Freq  PRN Reason Stop Dose Admin


 


Albuterol/Ipratropium  3 ml  08/31/20 14:30  09/07/20 07:29





  Duoneb  NEB   3 ml





  P5IX-YR KESHIA   Administration





     





     





     





     


 


Alprazolam  0.5 mg  09/04/20 12:19  09/06/20 21:25





  Xanax  PO   0.5 mg





  TIDPRN PRN   Administration





  Anxiety   





     





     





     


 


Benzonatate  100 mg  09/06/20 13:06  09/06/20 17:42





  Tessalon  PO   100 mg





  TIDPRN PRN   Administration





  Cough   





     





     





     


 


Budesonide  0.5 mg  08/31/20 18:30  09/07/20 07:32





  Pulmicort Neb Solution  INH   0.5 mg





  BID-RT KESHIA   Administration





     





     





     





     


 


Carvedilol  12.5 mg  09/05/20 09:00  09/06/20 21:25





  Coreg  PO   12.5 mg





  BID KESHIA   Administration





     





     





     





     


 


Cefdinir  300 mg  09/06/20 21:00  09/06/20 21:25





  Omnicef  PO   300 mg





  BID KESHIA   Administration





     





     





     





     


 


Docusate Sodium  100 mg  09/02/20 21:00  09/06/20 21:25





  Colace  PO   100 mg





  BID KESHIA   Administration





     





     





     





     


 


Fondaparinux  7.5 mg  09/01/20 09:00  09/06/20 09:31





  Arixtra  SC   7.5 mg





  DAILY KESHIA   Administration





     





     





     





     


 


Guaifenesin  600 mg  09/05/20 21:00  09/06/20 21:25





  Mucinex  PO   600 mg





  BID KESHIA   Administration





     





     





     





     


 


Megestrol Acetate  400 mg  09/01/20 09:00  09/06/20 09:25





  Megace  PO   400 mg





  DAILY KESHIA   Administration





     





     





     





     


 


Pantoprazole Sodium  40 mg  09/01/20 09:00  09/06/20 09:29





  Protonix  PO   40 mg





  DAILY KESHIA   Administration





     





     





     





     


 


Polyethylene Glycol  17 gm  09/02/20 10:48  09/04/20 09:16





  Miralax  PO   17 gm





  DAILYPRN PRN   Administration





  Constipation   





     





     





     


 


Potassium Chloride  20 meq  09/05/20 08:00  09/07/20 08:40





  K-Dur  PO   Not Given





  QAM-WM KESHIA   





     





     





     





     


 


Prednisone  40 mg  09/06/20 08:00  09/06/20 09:29





  Prednisone  PO   40 mg





  QAM-WM KESHIA   Administration





     





     





     





     


 


Senna/Docusate Sodium  2 tab  08/31/20 12:58  09/03/20 08:52





  Senokot S  PO   2 tab





  BIDPRN PRN   Administration





  Constipation   





     





     





     


 


Sertraline HCl  50 mg  09/01/20 09:00  09/06/20 09:30





  Zoloft  PO   50 mg





  DAILY KESHIA   Administration





     





     





     





     














- Exam


General Appearance: NAD, awake alert


ENT: normocephalic atraumatic, moist mucosa


Neck: supple, no JVD, no lymphadenopathy


Heart: RRR, no murmur, no gallops, no rubs, diminshed peripheral pulses


Respiratory: no wheezes, normal chest expansion


Respiratory - other findings: Crackles to bilateral lower lungs


Gastrointestinal: soft, non-tender, non-distended, normal bowel sounds


Extremities: no edema





Hosp A/P


(1) SOB (shortness of breath)


Code(s): R06.02 - SHORTNESS OF BREATH   Status: Acute   





(2) Acute respiratory disorder in immunocompromised patient


Code(s): J06.9 - ACUTE UPPER RESPIRATORY INFECTION, UNSPECIFIED; D89.9 - 

DISORDER INVOLVING THE IMMUNE MECHANISM, UNSPECIFIED   Status: Acute   





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


Code(s): C83.31 - DIFFUSE LARGE B-CELL LYMPHOMA, NODES OF HEAD, FACE, AND NECK 

  Status: Acute   





- Plan





We will arrange for home O2


Patient states she is ready for discharge, family at bedside


Has been switched to oral antibiotics today, will continue to taper steroids


Educated patient over monitoring blood pressure and O2 saturation at home








9/7 spoke with pulmoanry who would like to keep her for another day. will hold 

discharge for now.

## 2020-09-08 NOTE — PRG
DATE OF SERVICE:  09/08/2020



SUBJECTIVE:  The patient feels better and wants to go home.



OBJECTIVE:  VITAL SIGNS:  Temperature 98.7, pulse rate 84, respirations are 20, O2

saturation was 78% on room air, oxygen saturation 96% on 3 L nasal cannula, and

blood pressure 127/84. 

HEENT:  Unremarkable. 

NECK:  No adenopathy or JVD. 

LUNGS:  Inspiratory crackles bilaterally. 

CARDIAC:  S1 and S2.  Regular. 

ABDOMEN:  Soft. 

EXTREMITIES:  No edema.



ASSESSMENT:  The patient is presenting with pneumonia.  I think that there is a

reasonable chance that this is not infectious in nature and may be secondary to the

Rituxan she received for chemotherapy.  Her hypoxemia has persisted despite adequate

treatment with antibiotics and I think we are just left with waiting this out.  She

needs to be sent home on home oxygen.  I have asked her to follow up in about 3

weeks.  I would give her a total 10 days of antibiotics between hospital and home

and also would taper her steroids over a period of about 2 weeks.  She is to follow

up with me in 3 to 4 weeks. 







Job ID:  [id number]

## 2020-09-08 NOTE — DIS
DATE OF ADMISSION:  08/31/2020



DATE OF DISCHARGE:  09/08/2020



DISCHARGE DIAGNOSES:  As of the following;

1. Shortness of breath and acute hypoxic respiratory failure, most likely secondary

to pneumonia versus pneumonitis. 

2. Diffuse large B-cell lymphoma of the head.

3. Hypokalemia.

4. Sepsis, most likely secondary to pneumonia.

5. Moderate calorie protein malnutrition.

6. Urinary tract infection.



HOSPITAL COURSE:  The patient is a 74-year-old female, who initially presented to

the hospital on 08/31 with complaints of shortness of breath.  At this time, the

patient was also found to be hypotensive and was given some IV fluids and was

treated for pneumonia.  The patient underwent a CTA, which did not indicate any

acute pulmonary embolism, just indicated areas of interstitial thickening and

ground-glass opacities seen throughout the lungs diffusely with increased from prior

exam.  She also had some mediastinal lymphadenopathy previously, which had improved

__________.  She underwent an echocardiogram, which indicated an EF of 50% to 55%,

with some mild mitral and mild tricuspid regurgitation.  The patient was seen by

Pulmonary and also by Oncology.  She continued to improve through the hospital stay.

 She was put on initially on IV antibiotics and IV steroids, which was tapered to

oral antibiotics and oral steroids.  The patient's culture also indicated that she

had an E. coli, which was resistant to Levaquin and Cipro, which was; however,

sensitive to the third generation cephalosporins.  The patient at this time was put

on Omnicef. 



She will be discharged home.  She will follow up with her Primary and also with

Pulmonology.  The daughter is at the bedside.  She was made aware of it. 



Also, I have discontinued some of most of her blood pressure medications since she

has been running very low blood pressures. 



MEDICATIONS:  Her home medications will be;

1. Tessalon Perles as needed.

2. Budesonide 0.5 b.i.d.

3. Carvedilol 6.25 twice a day.

4. Omnicef 300 mg twice a day.

5. Ipratropium as needed.

6. Florastor 250 mg daily.

7. Prednisone taper.

8. Fondaparinux 7.5 mg subcu daily.

9. Sertraline 50 mg daily.

10. Alprazolam 0.25 p.o. t.i.d.

11. Lidocaine patch.

12. Nystatin as needed.

13. __________.



PHYSICAL EXAMINATION:

VITAL SIGNS:  On discharge; temperature of 98.7, heart rate 84, respiratory rate 20,

O2 saturations 96% on 2.5 L, and blood pressure 118/65. 

GENERAL:  She is awake, alert, and oriented x3.  Does not appear in distress. 

CV:  S1 and S2 present.  No murmurs, rubs, or gallops. 

LUNGS:  She has some mild crackles to the right lower lungs. 

ABDOMEN:  Soft and nontender.  Bowel sounds are present x2. 



Again, she will also require oxygen at home about 2 to 3 L, which she has been set

up with. 



She is a high risk for rebound given her extensive lung disease.  I have told the

family to make sure she follows up with the primary in about a week and also check

her pulse ox and also to encourage her to eat more calories. 







Job ID:  [id number]

## 2020-09-09 NOTE — EKG
Test Reason : 

Blood Pressure : ***/*** mmHG

Vent. Rate : 060 BPM     Atrial Rate : 060 BPM

   P-R Int : 142 ms          QRS Dur : 080 ms

    QT Int : 478 ms       P-R-T Axes : -15 024 027 degrees

   QTc Int : 478 ms

 

Normal sinus rhythm

Normal ECG

No previous ECGs available

Confirmed by DR. HAYDE VAUGHN MD (4) on 9/9/2020 1:18:27 PM

 

Referred By:  DAVID           Confirmed By:DR. HAYDE VAUGHN MD

## 2020-09-15 NOTE — RAD
EXAM:

Chest 2 views:



HISTORY:

Dyspnea



COMPARISON:

9/3/2020



FINDINGS:

There is a normal-sized cardiomediastinal silhouette.  The Mediport is unchanged in position. Diffuse
 mixed multifocal opacities are seen in the lungs, unchanged.  No pleural effusion is seen.  No

acute osseous abnormality.



IMPRESSION:

Stable exam



Reported By: Robert Beal 

Electronically Signed:  9/15/2020 11:24 AM

## 2020-09-17 NOTE — RAD
Portable frontal chest radiograph:

9/17/2020



COMPARISON: 9/3/2020



HISTORY: Abdominal pain, difficulty breathing



FINDINGS: Coarse increased linear interstitial densities are again noted diffusely, slightly improved
 when compared to the 9/3/2020 exam. Superimposed mild bilateral diffuse groundglass opacity noted,

improved as well. Heart and mediastinal contours are stable. Stable right-sided CT injectable Port-A-
Cath. No lobar consolidation or alveolar edema. No pneumothorax.



IMPRESSION: Nonspecific diffuse interstitial opacities/groundglass opacity, improved when compared to
 most recent prior imaging.



Reported By: Nathan Crawford 

Electronically Signed:  9/17/2020 11:23 PM

## 2020-09-18 NOTE — PDOC.HHP
Hospitalist HPI





- History of Present Illness


Shortness of breath


History of Present Illness: 


Patient is a 74 year old female with PMH nasopharyngeal large B cell lymphoma 

s/p 4 rounds chemotherapy who presents to ED for worsening shortness of breath. 

Patient was recently admitted at this hospital from 8/31 to 9/8 of this year for

respiratory failure, was discharged on home O2, was doing well on 3.5-4LPM of O2

for some time, however began to become more dyspneic and today presented to ED 

with visible increased work of breathing and tachypnea, requiring 4L by NC. 

During this recent admission 2 weeks ago, patient was diagnosed with pneumonia 

which was suspected to be secondary to rituximab, but is now suspected to be due

to pneumocystis, patient is on PO bactrim and follows with Dr Trammell of pulmona

ry medicine. imaging in ED revealed bilateral ground glass opacities, improved 

from previous imaging studies. Dr Trammell saw patient in ED and recommended 

admission for IV bactrim given slow improvement as outpatient on PO therapy. 

Other notable findings in ED include TnI of 0.107, patient denies chest 

pain/palpitations/syncope. WBC are 17.7. Vitals afebrile, hemodynamically 

stable. She was complaining of abdominal pain on arrival, CT A/P concerning for 

urinary retention and full bladder, adame placed with improvement of symptoms 

and significant urinary retention. 





Patient sees Dr Connor for lymphoma, last chemotherapy was in August of this 

year, completed 4 rounds chemotherapy. Chemo is to be held until patient 

recovers per most recent oncology progress note last admission. 





Hospitalist ROS





- Review of Systems


Constitutional: denies: fever, chills, sweats, weakness, malaise, other


Eyes: denies: pain, vision change, conjunctivae inflammation, eyelid 

inflammation, redness, other


ENT: denies: ear pain, ear discharge, nose pain, nose discharge, nose 

congestion, mouth pain, mouth swelling, throat pain, throat swelling, other


Respiratory: reports: shortness of breath.  denies: cough, dry, hemoptysis, SOB 

with excertion, pleuritic pain, sputum, wheezing, other


Cardiovascular: denies: chest pain, palpitations, orthopnea, paroxysmal noc. 

dyspnea, edema, light headedness, other


Gastrointestinal: denies: nausea, vomiting, abdominal pain, diarrhea, 

constipation, melena, hematochezia, other


Genitourinary: denies: dysuria, frequency, incontinence, hematuria, retention, 

other


Musculoskeletal: denies: neck pain, shoulder pain, arm pain, back pain, hand 

pain, leg pain, foot pain, other


Skin: denies: rash, lesions, kadeem, bruising, other


Neurological: denies: weakness, numbness, incoordination, change in speech, 

confusion, seizures, other


All other systems reviewed; all pertinent +/- noted in HPI/Subj





- Medication


Medications: 


carvedilol Fri Sep 18, 2020 01:21 FLAKO Garza Jt 


tablet : Strength - 3.125 mg : ORAL


Patient Dose: Unknown.DOSE UNK. 


predniSONE Fri Sep 18, 2020 01:21 FLAKO Garza Jt 


tablet : Strength - 1 mg : ORAL


Patient Dose: Unknown.DOSE UNK. 


ALPRAZolam Fri Sep 18, 2020 01:21 FLAKO Garza Jt 


tablet : Strength - 0.25 mg : ORAL


Patient Dose: Unknown.DOSE UNK. 


Norco Fri Sep 18, 2020 01:23 FLAKO Garza Jt 


tablet : Strength - 5 mg-325 mg : ORAL


Patient Dose: Unknown.DOSE UNK. 


Lovenox Fri Sep 18, 2020 01:23 FLAKO Garza Jt 


solution : Strength - 30 mg/0.3 mL : SUBCUTANEOUS


Patient Dose: Unknown.DOSE UNK.





Hospitalist History





- Past Medical History


Heme/Onc: reports: no pertinent history


Hepatobiliary: reports: no pertinent history


Psych: reports: no pertinent history


Renal/: reports: UTI


Other Medical History: 





lymphoma


suspected pneumocystis pneumonia


HTN





- Past Surgical History


Past Surgical History: reports: Cystoscopy, Hysterectomy





- Family History


Family History: reports: no pertinent history





- Social History


Alcohol: reports: None


Drugs: reports: none





- Exam


General Appearance: NAD, awake alert


Eye: PERRL, anicteric sclera


ENT: normocephalic atraumatic, no oropharyngeal lesions, moist mucosa


Neck: supple, symmetric, no JVD, no thyromegaly, no lymphadenopathy, no carotid 

bruit


Heart: RRR, no murmur, no gallops, no rubs, normal peripheral pulses


Respiratory: CTAB, no wheezes, no rales, no ronchi, normal chest expansion, no 

tachypnea, normal percussion


Gastrointestinal: soft, non-tender, non-distended, normal bowel sounds, no 

palpable masses, no hepatomegaly, no splenomegaly, no bruit


Extremities: no cyanosis, no clubbing, no edema


Skin: normal turgor, no lesions, no rashes


Neurological: cranial nerve grossly intact, normal sensation to touch, no 

weakness, no focal deficits, no new deficit


Musculoskeletal: normal tone, normal strength, no muscle wasting


Psychiatric: normal affect, normal behavior, A&O x 3





Hospitalist Results





- Labs


Result Diagrams: 


                                 09/17/20 22:36





                                 09/17/20 22:36


Lab results: 


                                        











WBC  17.7 thou/uL (4.8-10.8)  H  09/17/20  22:36    


 


Hgb  14.4 g/dL (12.0-16.0)   09/17/20  22:36    


 


Hct  43.9 % (36.0-47.0)   09/17/20  22:36    


 


MCV  95.0 fL (78.0-98.0)   09/17/20  22:36    


 


Plt Count  165 thou/uL (130-400)   09/17/20  22:36    


 


Neutrophils %  86.8 % (42.0-75.0)  H  09/17/20  22:36    


 


Sodium  135 mmol/L (136-145)  L  09/17/20  22:36    


 


Potassium  4.6 mmol/L (3.5-5.1)   09/17/20  22:36    


 


Chloride  105 mmol/L ()   09/17/20  22:36    


 


Carbon Dioxide  20 mmol/L (23-31)  L  09/17/20  22:36    


 


BUN  19 mg/dL (9.8-20.1)   09/17/20  22:36    


 


Creatinine  0.89 mg/dL (0.6-1.1)   09/17/20  22:36    


 


Glucose  133 mg/dL ()  H  09/17/20  22:36    


 


Calcium  9.0 mg/dL (7.8-10.44)   09/17/20  22:36    


 


Total Bilirubin  0.5 mg/dL (0.2-1.2)   09/17/20  22:36    


 


AST  21 U/L (5-34)   09/17/20  22:36    


 


ALT  29 U/L (8-55)   09/17/20  22:36    


 


Alkaline Phosphatase  85 U/L ()   09/17/20  22:36    


 


CK-MB (CK-2)  2.9 ng/mL (0-6.6)   09/17/20  22:36    


 


Troponin I  0.108 ng/mL (< 0.028)  H  09/18/20  01:27    


 


Serum Total Protein  6.5 g/dL (6.0-8.3)   09/17/20  22:36    


 


Albumin  3.7 g/dL (3.4-4.8)   09/17/20  22:36    


 


Urine Ketones  Negative mg/dL (Negative)   09/17/20  23:56    


 


Urine Blood  Negative  (Negative)   09/17/20  23:56    


 


Urine Nitrite  Negative  (Negative)   09/17/20  23:56    


 


Ur Leukocyte Esterase  25 Krishna/uL (Negative)  A  09/17/20  23:56    


 


Urine RBC  0-3 HPF (0-3)   09/17/20  23:56    


 


Urine WBC  4-6 HPF (0-3)  A  09/17/20  23:56    


 


Ur Squamous Epith Cells  0-3 HPF (0-3)   09/17/20  23:56    


 


Urine Bacteria  None Seen HPF (None Seen)   09/17/20  23:56    











Additional comment: 


VITAL SIGNS Fri Sep 18, 2020 01:19 FLAKO Garza, Jt 


BP: 119/69


Pulse: 89


Resp: 20


Pain: 0


O2 sat: 99 on (3L Oxygen)


Time: 9/18/2020 01:19.














ED documents, labs, imaging reports reviewed





Hospitalist H&P A/P





- Plan


Plan: 


Patient is a 74 year old female with PMH nasopharyngeal large B cell lymphoma 

s/p 4 rounds chemotherapy who presents to ED for worsening shortness of breath. 





# pneumocystic pneumonia


# respiratory distress


# acute and chronic respiratory failure


# sepsis due to pneumonia


Patient was recently admitted at this hospital from 8/31 to 9/8 of this year for

respiratory failure, pneumonia, was discharged on home O2, became more dyspneic 

and today presented to ED with increased work of breathing and tachypnea. p

neumonia currently suspected to be due to pneumocystis, patient is on PO bactrim

and follows with Dr Trammell of pulmonary medicine. imaging in ED revealed 

bilateral ground glass opacities, improved from previous imaging studies. Dr Trammell saw patient in ED and recommended admission for IV bactrim given slow 

improvement as outpatient on PO therapy. 


- admit to floor


- start IV bactrim 15 mg/kg/day divided in 3 doses per day, discussed dosing 

with pharmacist


- start IV steroids


- continue omnicef


- consult pulmonary Dr Trammell


- continue home inhalers, start scheduled and PRN duonebs


- follow up covid test results





# B cell lymphoma - patient sees Dr Connor for lymphoma, last chemotherapy was 

in August of this year, completed 4 rounds chemotherapy. Chemo is to be held 

until patient recovers per most recent oncology progress note last admission. 





# elevated troponin - no chest pain, suspect this is due to hypoxia, trend 

troponin


- recent echo this month showed preserved EF and mild valve disease





# urinary retention - no new medications besides bactrim, continue adame and 

attempt weaning trial once clinically improved





# DVT/GI ppx

## 2020-09-18 NOTE — CT
PRELIMINARY REPORT/[facility]/EMERGENCY AFTER HOURS PROCEDURE:



EXAM: CT Abdomen and Pelvis with Intravenous Contrast 



CLINICAL HISTORY: LOWER ABDOMINAL PAIN, PAIN WITH URINATION; Surgical history of hysterectomy 



TECHNIQUE: Axial computed tomography images of the abdomen and pelvis with intravenous contrast. 



CONTRAST: With; ISOVUE 370. 90ML 



COMPARISON:  CTSR  - CT ABDOMEN PELVIS W WO CON  - 07/18/2019 08:56 AM CDT 



FINDINGS: 

LUNG BASES: Diffuse bibasilar reticular and groundglass opacities. 

LIVER: Unremarkable. 

GALLBLADDER AND BILE DUCTS: Unremarkable. No calcified stone. No ductal dilation. 

PANCREAS: Unremarkable. 

SPLEEN: Unremarkable. 

ADRENAL GLANDS: Unremarkable. 

KIDNEYS, URETERS, AND BLADDER: Unremarkable.  Multiple hypodensities in the renal pelves likely repre
sent parapelvic cysts.  No hydronephrosis or nephrolithiasis. No ureteral or bladder calculi. 

STOMACH AND BOWEL: Small hiatal hernia.  No obstruction. No wall thickening.  Scattered colonic diver
ticuli.  No CT evidence of colitis or acute diverticulitis. 

APPENDIX: No CT evidence for appendicitis. 

PERITONEUM: No free fluid. No free air. 

LYMPH NODES: No lymphadenopathy. 

REPRODUCTIVE: Uterus is surgically absent.  No suspicious adnexal mass. 

VASCULATURE: No aortic aneurysm.  Scattered atherosclerotic vascular calcifications of the aorta and 
branching vessels. 

BONES: No fracture or suspicious osseous abnormality.  Moderate multilevel degenerative changes of th
e lumbar spine most pronounced at L2-3. 

ABDOMINAL WALL AND SOFT TISSUES: Unremarkable. 



IMPRESSION: 

1.  No acute intra-abdominal or pelvic abnormality.   

2.  No urolithiasis or obstructive uropathy.  Multiple bilateral renal parapelvic cysts. 

3.  Bibasilar interstitial and groundglass opacities suggestive of chronic interstitial lung disease.
 

4.  Small hiatal hernia.



ELECTRONICALLY SIGNED BY:

Ralph Franklin DO

Sep 18, 2020 12:46:22 AM CDT





FINAL REPORT



CT ABDOMEN AND PELVIS WITH IV CONTRAST:

I agree with the preliminary report given by Dr. Ralph Franklin of [facility].



Transcribed Date/Time: 9/18/2020 8:50 AM



Reported By: Zak Westbrook 

Electronically Signed:  9/18/2020 11:19 AM

## 2020-09-18 NOTE — CON
DATE OF CONSULTATION:  09/18/2020



CONSULTING PHYSICIAN:  [facility].



REASON FOR CONSULTATION:  Pneumonia.



HISTORY OF PRESENT ILLNESS:  Ms. Fair's case is well known to me.  She is a

74-year-old, who I was initially consulted on September 1st.  She presented at that

time with diffuse pulmonary infiltrates.  She has a history of B-cell lymphoma,

which apparently is aggressive.  She had just been treated with Adriamycin, Cytoxan,

vincristine, and Rituxan.  She had developed pancytopenia from that chemo.  At that

time, she was treated with broad-spectrum IV antibiotics and steroids, was hypoxic,

but got better to the point where she went home on oxygen.  She presented to my

office earlier this week as Dr. Connor had requested that we re-evaluate the

patient quickly, because she thought the patient will be needing chemo in the near

future.  The patient at that time complained of continued shortness of breath.

X-ray showed persistent infiltrates without improvement.  I discussed the prospect

of bronchoscopy, BAL, and open lung biopsy with the patient.  She refused any

aggressive procedure, but was agreeable for empiric treatment of Pneumocystis

pneumonia.  She was already taking prednisone.  I put her on Bactrim 2 tablets three

times daily.  I am told she only took one dose of that in the span of 3 days.  She

was brought to the hospital last night with abdominal pain.  It turns out she had a

grossly distended bladder, which was relieved by Smith catheter placement.  I was up

seeing someone else in the hospital last night and I went to see her and she

surprisingly did not look to be in any distress.  They repeated her x-ray and it

looked better from a film taken earlier in the week. 



PAST MEDICAL HISTORY:  

1. 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

2. Hypertension.



PAST SURGICAL HISTORY:  

1. Hysterectomy.

2. MediPort placement.



SOCIAL HISTORY:  Nonsmoker.  Does not consume alcohol.



REVIEW OF SYSTEMS:  Remarkable for some abdominal pain, shortness of breath, dry

cough. 



MEDICATIONS:  Prior to admission, reviewed, see chart.



ALLERGIES:  NONE.



PHYSICAL EXAMINATION:

VITAL SIGNS:  O2 saturation 92% on 3 L, temperature 96.6, pulse 95, blood pressure

111/56. 

HEENT:  Unremarkable. 

NECK:  No adenopathy or JVD. 

LUNGS:  She has inspiratory crackles bilaterally. 

CARDIAC: S1, S2.  Regular without audible murmur. 

ABDOMEN:  Soft and nontender to palpation. 

EXTREMITIES:  No clubbing, cyanosis, or edema.



DIAGNOSTIC DATA:  An echocardiogram obtained last admission showed EF 50% to 55%

with some mild mitral regurgitation. 



LABORATORY DATA:  Current labs; white blood cell count 17.7, hematocrit 43.9, and

platelet count 165. Sodium 135, potassium 4.6, chloride 105, CO2 of 20, BUN 19,

creatinine 0.8, glucose 133, troponin 0.108.  Urinalysis shows some leukocyte

esterase.  An LDH that was obtained through my office was elevated at 439.  I also

sent off a Fungitell assay.  The result was below the detectable limit. 



ASSESSMENT AND PLAN:  Current x-ray shows diffuse alveolar/ground-glass infiltrative

changes, which have waxed and waned since before she was diagnosed with the large

cell lymphoma.  I went back on films to April 2020, which showed infiltrates on both

sides in some shape, form or fashion.  My initial thought was this is probably some

nonspecific interstitial pneumonitis and may be related to her lymphoma.  I doubt

she has bacterial pneumonia, but I cannot completely rule out the possibility of

Pneumocystis pneumonia, given her chronic corticosteroid use and chemotherapy. 



My recommendation would be to go ahead and continue to treat her with IV Bactrim for

the next several days. Continue the steroids.  I broached the subject of

bronchoalveolar lavage and lung biopsy with the patient again.  She refused to

proceed with any aggressive procedures.  Overall, I think this situation may not be

reparable, but we will give her a few days and see how she does. 







Job ID:  [id number]

## 2020-09-19 NOTE — PDOC.HOSPP
- Subjective


Encounter Date: 09/19/20


Encounter Time: 10:20


Subjective: 


Patient sitting in the bed.  She appears quite in a respiratory distress with 

mild exertion even talking seems to put her at short of breath.  daughter at 

bedside and discussed care plan with her.





- Objective


Vital Signs & Weight: 


                             Vital Signs (12 hours)











  Temp Pulse Resp BP Pulse Ox


 


 09/19/20 11:31   87  20  


 


 09/19/20 08:25   82  24 H  


 


 09/19/20 08:00  97.4 F L  84  17  115/62  95


 


 09/19/20 04:00  98.4 F  93  25 H  131/62  100








                                     Weight











Admit Weight                   121 lb 12.8 oz


 


Weight                         121 lb 1.6 oz














I&O: 


                                        











 09/18/20 09/19/20 09/20/20





 06:59 06:59 06:59


 


Intake Total 750 980 480


 


Output Total 1275 700 650


 


Balance -525 280 -170











Result Diagrams: 


                                 09/19/20 04:46





                                 09/19/20 04:46





Hospitalist ROS





- Medication


Medications: 


Active Medications











Generic Name Dose Route Start Last Admin





  Trade Name Freq  PRN Reason Stop Dose Admin


 


Albuterol/Ipratropium  3 ml  09/18/20 07:00  09/19/20 11:31





  Ipratropium/Albuterol Sulfate 3 Ml Neb  NEB   3 ml





  [id number] KESHIA   Administration


 


Alprazolam  0.25 mg  09/18/20 03:24  09/19/20 09:24





  Alprazolam 0.25 Mg Tab  PO   0.25 mg





  TID PRN   Administration





  Anxiety  


 


Benzonatate  100 mg  09/18/20 03:24  09/18/20 21:10





  Benzonatate 100 Mg Cap  PO   100 mg





  TIDPRN PRN   Administration





  Cough  


 


Budesonide  0.5 mg  09/18/20 06:30  09/19/20 08:25





  Budesonide 0.5 Mg/2 Ml Neb  NEB   0.5 mg





  BID-RT KESHIA   Administration


 


Calcium Carbonate  1,000 mg  09/18/20 15:06  09/18/20 15:47





  Calcium Carbonate 500 Mg Chewtab  PO   1,000 mg





  Q4H PRN   Administration





  Heartburn  or Indigestion  


 


Carvedilol  6.25 mg  09/18/20 09:00  09/19/20 09:18





  Carvedilol 6.25 Mg Tab  PO   6.25 mg





  BID KESHIA   Administration


 


Cefdinir  300 mg  09/18/20 09:00  09/19/20 09:18





  Cefdinir 300 Mg Cap  PO   300 mg





  BID KESHIA   Administration


 


Enoxaparin Sodium  60 mg  09/18/20 21:00  09/19/20 09:19





  Enoxaparin Sodium 60 Mg/0.6 Ml Syringe  SC   60 mg





  0900,2100 KESHIA   Administration


 


Trimethoprim/Sulfamethoxazole  500 mls @ 333.333 mls/hr  09/18/20 10:00  

09/19/20 11:24





  270 mg/ Dextrose/Water  IVPB   500 mls





  0200,1000,1800 KESHIA   Administration


 


Melatonin  6 mg  09/18/20 21:34  09/18/20 23:28





  Melatonin 3 Mg Tab  PO   6 mg





  HS PRN   Administration





  Insomnia  


 


Methylprednisolone Sodium Succinate  60 mg  09/18/20 06:00  09/19/20 05:48





  Methylprednisolone Sod Succ/Pf 125 Mg/2 Ml Vial  IVP   60 mg





  Q8HR KESHIA   Administration


 


Pantoprazole Sodium  40 mg  09/18/20 09:00  09/19/20 09:18





  Pantoprazole 40 Mg Tab  PO   40 mg





  DAILY KESHIA   Administration


 


Polyethylene Glycol  17 gm  09/18/20 09:00  09/19/20 09:19





  Polyethylene Glycol 3350 17 Gm Packet  PO   17 gm





  DAILY KESHIA   Administration


 


Senna  2 tab  09/18/20 15:15  09/18/20 15:47





  Senokot 8.6 Mg Tab  PO   2 tab





  HSPRN KESHIA   Administration


 


Sertraline HCl  50 mg  09/18/20 09:00  09/19/20 09:18





  Sertraline Hcl 25 Mg Tab  PO   50 mg





  DAILY KESHIA   Administration














- Exam


General Appearance: ill appearing


General - other findings: Confused


Eye: PERRL


ENT: normocephalic atraumatic


Neck: supple


Heart: RRR


Respiratory: CTAB, normal chest expansion


Gastrointestinal: soft, normal bowel sounds


Extremities - other findings: Mild bleeding around the IV access site.


Neurological: no focal deficits


Psychiatric: oriented to person, somnolent





Hosp A/P





- Plan











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 

chemotherapy who presents to ED for worsening shortness of breath. 





# pneumocystic pneumonia


# acute and chronic respiratory failure


# sepsis due to pneumonia, POA


-unwilling to pursue bronchoscopic evaluation and biopsy due to the high risk of

requirement for mechanical ventilation 





- start IV bactrim 15 mg/kg/day divided in 3 doses per day, discussed dosing 

with pharmacist


- start IV steroids--meth==60 q 8


- pulmonary Dr Trammell following





- continue home inhalers, start scheduled and PRN duonebs


- follow up covid test results-------------> negative





# B cell lymphoma - patient sees Dr Connor for lymphoma, last chemotherapy was 

in August of this year, completed 4 rounds chemotherapy. Chemo is to be held 

until patient recovers per onc..





# elevated troponin -NSTEMI type II metabolic mismatch  trend troponin


- recent echo this month showed preserved EF and mild valve disease





# urinary retention - no new medications besides bactrim, continue adame and 

attempt weaning trial once clinically improved





# on Arixtra for DVT.  Will change her to therapeutic dose Lovenox while she is 

here.

## 2020-09-19 NOTE — PRG
DATE OF SERVICE:  09/19/2020



PRESENT ILLNESS:  She continues to have significant breathlessness and is on 4 L

nasal cannula.  At this level, she has difficulty getting more than to the bedside

commode.  She is not producing any sputum.  She has a history of B-cell lymphoma,

treated with a regimen of Adriamycin, Cytoxan, vincristine, and Rituxan.  She had

pancytopenia, treated with broad-spectrum antibiotics, at which time she developed

hypoxia.  Her x-ray has not improved and discussion was held regarding definitive

evaluation via bronchoscopy with lavage and biopsy.  At that time, the patient

refused bronchoscopy due to the very high risk of respiratory failure necessitating

ventilatory support.  She was treated empirically with antibiotic therapy for

Pneumocystis and was on prednisone.  She has subsequently been admitted to the

intensive care unit.  Her symptoms have not really dramatically improved.  The

question remains as to the process within the lung and the best mechanism of

diagnosis and treatment. 



PHYSICAL EXAMINATION:

VITAL SIGNS:  Blood pressure 124/64, pulse ox is 96% on 4 L.  She is afebrile.

Heart rate is 97. 

GENERAL:  She is fairly dyspneic at rest and in conversation.  She has no palpable

cervical adenopathy. 

LUNGS:  Reveal bilateral crackles, but no wheezes. 

HEART:  Regular rate and rhythm. 

ABDOMEN:  Soft.  There is no organomegaly. 

EXTREMITIES:  She has trace ankle edema.



LABORATORY DATA:  White count 12,500, hemoglobin is 11 with hematocrit 33.8, and

platelet count of 129,000.  Chemistry remarkable for sodium 131, potassium 5, CO2 of

17, BUN 20, and creatinine 0.9. 



Chest x-ray shows faint interstitial pattern, which is about the same as admission

given significant differences in technique. 



IMPRESSION:  

1. B-cell lymphoma, previously treated with chemotherapy, which was discontinued due

to leukocytosis, worsening shortness of breath and presumed infection. 

2. Abnormal chest x-ray.

3. History of chronic obstructive pulmonary disease.



PLAN:  I have again reviewed the situation with the patient and her daughter.  In

the absence of ability/willingness to do an invasive biopsy, our treatment is

exclusively supportive and driven by symptoms.  She is receiving steroids and

antibiotics as well as breathing treatments.  I am not sure that seeing an another

pulmonary specialist will allow definition of the cause in the absence of a

diagnostic procedure. There has been some conversation about going to [place] to see

a pulmonologist at [facility].  I have encouraged them to continue

to receive their care through [facility] and there lung doctors as it provides a

consistent line of communication and access to medical record.  At this point, she

is too breathless to go home and the next soonest available appointment is about 10

days.  Hopefully by then, she will begin to improve with the treatments we have

rendered. 







Job ID:  [id number]

## 2020-09-19 NOTE — EKG
Test Reason : 

Blood Pressure : ***/*** mmHG

Vent. Rate : 099 BPM     Atrial Rate : 099 BPM

   P-R Int : 132 ms          QRS Dur : 072 ms

    QT Int : 362 ms       P-R-T Axes : 024 -17 108 degrees

   QTc Int : 464 ms

 

Normal sinus rhythm

Left ventricular hypertrophy with repolarization abnormality

Abnormal ECG

 

Confirmed by CHERRY COOL ([id number]),  JORGITO ACOSTA ([id number]) on 9/19/2020 2:08:05 PM

 

Referred By:             Confirmed By:CHERRY COOL

## 2020-09-20 NOTE — PDOC.HOSPP
- Subjective


Encounter Date: 09/20/20


Encounter Time: 10:30


Subjective: 


More than 20 minutes spent today explaining care plan.  Patient is quite 

confused about her etiology for shortness of breath.  Either lymphoma or 

interstitial pneumonitis.  I think now she understands after lengthy conversati

on with her and daughter that she may need to follow-up with her pulmonologist 

at [facility] in [place].  She does have an appointment and coming up  within 10 

days.





She is still short of breath even with lengthy conversation.  Lungs still have 

ongoing crackles.





- Objective


Vital Signs & Weight: 


                             Vital Signs (12 hours)











  Temp Pulse Resp BP Pulse Ox


 


 09/20/20 08:44   102 H  24 H  


 


 09/20/20 03:32  98.1 F  87  16  138/61  94 L








                                     Weight











Admit Weight                   121 lb 12.8 oz


 


Weight                         121 lb 11.2 oz














I&O: 


                                        











 09/19/20 09/20/20 09/21/20





 06:59 06:59 06:59


 


Intake Total 980 1800 


 


Output Total 700 3050 


 


Balance 280 -1250 











Result Diagrams: 


                                 09/19/20 04:46





                                 09/19/20 04:46





Hospitalist ROS





- Medication


Medications: 


Active Medications











Generic Name Dose Route Start Last Admin





  Trade Name Freq  PRN Reason Stop Dose Admin


 


Albuterol/Ipratropium  3 ml  09/18/20 07:00  09/20/20 08:44





  Ipratropium/Albuterol Sulfate 3 Ml Neb  NEB   3 ml





  [id number] KESHIA   Administration


 


Alprazolam  0.25 mg  09/18/20 03:24  09/19/20 21:19





  Alprazolam 0.25 Mg Tab  PO   0.25 mg





  TID PRN   Administration





  Anxiety  


 


Benzonatate  100 mg  09/18/20 03:24  09/20/20 10:47





  Benzonatate 100 Mg Cap  PO   100 mg





  TIDPRN PRN   Administration





  Cough  


 


Budesonide  0.5 mg  09/18/20 06:30  09/20/20 08:44





  Budesonide 0.5 Mg/2 Ml Neb  NEB   0.5 mg





  BID-RT KESHIA   Administration


 


Calcium Carbonate  1,000 mg  09/18/20 15:06  09/18/20 15:47





  Calcium Carbonate 500 Mg Chewtab  PO   1,000 mg





  Q4H PRN   Administration





  Heartburn  or Indigestion  


 


Carvedilol  6.25 mg  09/18/20 09:00  09/20/20 09:57





  Carvedilol 6.25 Mg Tab  PO   6.25 mg





  BID KESHIA   Administration


 


Cefdinir  300 mg  09/18/20 09:00  09/20/20 09:57





  Cefdinir 300 Mg Cap  PO   300 mg





  BID EKSHIA   Administration


 


Enoxaparin Sodium  60 mg  09/18/20 21:00  09/20/20 09:57





  Enoxaparin Sodium 60 Mg/0.6 Ml Syringe  SC   60 mg





  0900,2100 KESHIA   Administration


 


Guaifenesin/Dextromethorphan  15 ml  09/18/20 03:25  09/20/20 10:47





  Guaifenesin Dm 100-10/5 Ml Udcup  PO   15 ml





  Q4H PRN   Administration





  Cough  


 


Trimethoprim/Sulfamethoxazole  500 mls @ 333.333 mls/hr  09/18/20 10:00  

09/20/20 10:53





  270 mg/ Dextrose/Water  IVPB   500 mls





  0200,1000,1800 KESHIA   Administration


 


Melatonin  6 mg  09/18/20 21:34  09/18/20 23:28





  Melatonin 3 Mg Tab  PO   6 mg





  HS PRN   Administration





  Insomnia  


 


Methylprednisolone Sodium Succinate  60 mg  09/18/20 06:00  09/20/20 05:06





  Methylprednisolone Sod Succ/Pf 125 Mg/2 Ml Vial  IVP   60 mg





  Q8HR KESHIA   Administration


 


Pantoprazole Sodium  40 mg  09/18/20 09:00  09/20/20 09:57





  Pantoprazole 40 Mg Tab  PO   40 mg





  DAILY KESHIA   Administration


 


Polyethylene Glycol  17 gm  09/18/20 09:00  09/20/20 09:57





  Polyethylene Glycol 3350 17 Gm Packet  PO   17 gm





  DAILY KESHIA   Administration


 


Senna  2 tab  09/18/20 15:15  09/19/20 17:58





  Senokot 8.6 Mg Tab  PO   2 tab





  HSPRN KESHIA   Administration


 


Sertraline HCl  50 mg  09/18/20 09:00  09/20/20 09:57





  Sertraline Hcl 25 Mg Tab  PO   50 mg





  DAILY KESHIA   Administration














- Exam


General Appearance: NAD, awake alert, ill appearing


Eye: PERRL


ENT: normocephalic atraumatic


Neck: supple


Heart: RRR


Respiratory: normal chest expansion, rales, rhonchi, tachypneic


Gastrointestinal: soft, normal bowel sounds


Neurological: no focal deficits


Psychiatric: A&O x 3





Hosp A/P





- Plan











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 

chemotherapy who presents to ED for worsening shortness of breath. 





# pneumocystic pneumonia


# acute and chronic respiratory failure


# sepsis due to pneumonia, POA


-unwilling to pursue bronchoscopic evaluation and biopsy due to the high risk of

requirement for mechanical ventilation 





- start IV bactrim 15 mg/kg/day divided in 3 doses per day, discussed dosing 

with pharmacist


- start IV steroids--meth==60 q 8


- pulmonary Dr Trammell following





- continue home inhalers, start scheduled and PRN duonebs


- follow up covid test results-------------> negative





# B cell lymphoma - patient sees Dr Connor for lymphoma, last chemotherapy was 

in August of this year, completed 4 rounds chemotherapy. Chemo is to be held 

until patient recovers per onc..





# elevated troponin -NSTEMI type II metabolic mismatch  trend troponin


- recent echo this month showed preserved EF and mild valve disease





# urinary retention - no new medications besides bactrim, continue adame and 

attempt weaning trial once clinically improved





# on Arixtra for DVT.  Will change her to therapeutic dose Lovenox while she is 

here.





19th


More than 20 minutes spent today explaining care plan.  Patient is quite c

onfused about her etiology for shortness of breath.  Either lymphoma or 

interstitial pneumonitis.  I think now she understands after lengthy 

conversation with her and daughter that she may need to follow-up with her 

pulmonologist at [facility] in [place].  She does have an appointment and coming up 

within 10 days.





Pulmonary rehab plan discussed.  She prefers to go home and she states that she 

is able to walk around at home with oxygen.  She has a spouse at home 


-probable home health when we are ready to discharge her.


-Pending pulmonology clearance.


-Dr. Trammell likely be here tomorrow.  Will change her IV antibiotic, bactrim 

and IV steroid to p.o. prior to discharge.

## 2020-09-20 NOTE — PRG
DATE OF SERVICE:  09/20/2020



SUBJECTIVE:  Ms. Fair continues on oxygen at 4 L nasal cannula.  She is quite

breathless in conversation.  She still has her Smith and has not been engaged in a

regular therapy program.  She still wants to try to go home and I think this is

reasonable, assuming that we can get her to a relatively safe status.  She denies

cough or sputum, fevers or chills. 



PHYSICAL EXAMINATION:

VITAL SIGNS:  Blood pressure 143/61.  She is afebrile.  Pulse ox 91% on 4 L. 

GENERAL:  She is moderately dyspneic with shallow efforts and short sentence length. 

LUNGS:  Showed diffuse crackles without wheezes. 

HEART:  Regular rate and rhythm.  Distant S1 and S2. 

ABDOMEN:  Soft. 

EXTREMITIES:  She has no edema.



LABORATORY DATA:  None today.



IMPRESSION:  B-cell lymphoma, now with interstitial prominence of unclear cause.

Differential diagnosis would include pneumonitis secondary to medications,

lymphangitic tumor, atypical infection. 



PLAN:  She will continue with current therapy.  I have discontinued her Smith, and

we will get her seen by Physical Therapy.  She would ideally like to go home and I

have encouraged her to follow up with the oncologists and other services at [facility], where she has received her care. 







Job ID:  [id number]

## 2020-09-21 NOTE — PDOC.HOSPP
- Subjective


Encounter Date: 09/21/20


Encounter Time: 11:00


Subjective: 


Patient had a large blood clot in the stool.  She finally had some bowel 

movement.  She states that she was straining may be that is the reason she got 

some blood clot.  she is also satting 93% with oxygen.  I stopped the Lovenox.  

Put her on Protonix.  She also acknowledges that she is not improving much since

admission.  I have consulted the palliative this weekend who  will be evaluating

her pretty soon.


Patient also has a high potassium probably due to blood clot per rectum.


Spouse at bedside





- Objective


Vital Signs & Weight: 


                             Vital Signs (12 hours)











  Temp Pulse Pulse Pulse Resp BP BP


 


 09/21/20 11:31  98.0 F  83    24 H  


 


 09/21/20 10:38   63    24 H  


 


 09/21/20 09:59     71   132/62 


 


 09/21/20 09:42    81  71    132/62


 


 09/21/20 07:46  97.5 F L  90    20  


 


 09/21/20 06:36       


 


 09/21/20 06:33   94    24 H  


 


 09/21/20 03:01  98.3 F  86    18  














  BP BP Pulse Ox


 


 09/21/20 11:31   131/62  91 L


 


 09/21/20 10:38    90 L


 


 09/21/20 09:59  167/71 H  


 


 09/21/20 09:42  167/71 H  


 


 09/21/20 07:46   145/81 H  90 L


 


 09/21/20 06:36    93 L


 


 09/21/20 06:33    89 L


 


 09/21/20 03:01   134/63  97








                                     Weight











Admit Weight                   121 lb 12.8 oz


 


Weight                         121 lb 11.2 oz














I&O: 


                                        











 09/20/20 09/21/20 09/22/20





 06:59 06:59 06:59


 


Intake Total 1800 1680 


 


Output Total 3050 3050 


 


Balance -1250 -1370 











Result Diagrams: 


                                 09/21/20 09:42





                                 09/21/20 07:52


Additional Labs: 


                                   Accuchecks











  09/21/20





  11:08


 


POC Glucose  241 H














Hospitalist ROS





- Medication


Medications: 


Active Medications











Generic Name Dose Route Start Last Admin





  Trade Name Freq  PRN Reason Stop Dose Admin


 


Albuterol/Ipratropium  3 ml  09/18/20 07:00  09/21/20 10:38





  Ipratropium/Albuterol Sulfate 3 Ml Neb  NEB   3 ml





  [id number] KESHIA   Administration


 


Alprazolam  0.25 mg  09/18/20 03:24  09/20/20 23:08





  Alprazolam 0.25 Mg Tab  PO   0.25 mg





  TID PRN   Administration





  Anxiety  


 


Benzonatate  100 mg  09/18/20 03:24  09/20/20 23:08





  Benzonatate 100 Mg Cap  PO   100 mg





  TIDPRN PRN   Administration





  Cough  


 


Budesonide  0.5 mg  09/18/20 06:30  09/21/20 06:36





  Budesonide 0.5 Mg/2 Ml Neb  NEB   0.5 mg





  BID-RT KESHIA   Administration


 


Calcium Carbonate  1,000 mg  09/18/20 15:06  09/18/20 15:47





  Calcium Carbonate 500 Mg Chewtab  PO   1,000 mg





  Q4H PRN   Administration





  Heartburn  or Indigestion  


 


Carvedilol  6.25 mg  09/18/20 09:00  09/21/20 08:00





  Carvedilol 6.25 Mg Tab  PO   6.25 mg





  BID KESHIA   Administration


 


Guaifenesin/Dextromethorphan  15 ml  09/18/20 03:25  09/20/20 21:11





  Guaifenesin Dm 100-10/5 Ml Udcup  PO   15 ml





  Q4H PRN   Administration





  Cough  


 


Melatonin  6 mg  09/18/20 21:34  09/18/20 23:28





  Melatonin 3 Mg Tab  PO   6 mg





  HS PRN   Administration





  Insomnia  


 


Polyethylene Glycol  17 gm  09/18/20 09:00  09/21/20 08:03





  Polyethylene Glycol 3350 17 Gm Packet  PO   Not Given





  DAILY KESHIA  


 


Senna  2 tab  09/18/20 15:15  09/19/20 17:58





  Senokot 8.6 Mg Tab  PO   2 tab





  HSPRN KESHIA   Administration


 


Sertraline HCl  50 mg  09/18/20 09:00  09/21/20 08:00





  Sertraline Hcl 25 Mg Tab  PO   50 mg





  DAILY KESHIA   Administration


 


Sodium Chloride  10 ml  09/21/20 09:00  09/21/20 09:44





  Flush - Normal Saline 10 Ml Syringe  IVF   10 ml





  Q12HR KESHIA   Administration


 


Sodium Polystyrene Sulfonate  30 gm  09/21/20 09:15  09/21/20 09:55





  Sodium Polystyrene Sulfonate 15 Gm/60 Ml Bot  PO  09/21/20 14:00  30 gm





  NOW KESHIA   Administration














- Exam


General Appearance: NAD, awake alert, ill appearing


Eye: PERRL


ENT: normocephalic atraumatic


Neck: supple


Heart: RRR, normal peripheral pulses


Respiratory: normal chest expansion, rales, rhonchi, tachypneic


Gastrointestinal: soft, normal bowel sounds


Neurological: no focal deficits


Psychiatric: A&O x 3





Hosp A/P





- Plan











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

hemotherapy who presents to ED for worsening shortness of breath. 





# pneumocystic pneumonia


# acute and chronic respiratory failure


# sepsis due to pneumonia, POA


-unwilling to pursue bronchoscopic evaluation and biopsy due to the high risk of

requirement for mechanical ventilation 





- start IV bactrim 15 mg/kg/day divided in 3 doses per day, discussed dosing 

with pharmacist


- start IV steroids--meth==60 q 8


- pulmonary Dr Trammell following





- continue home inhalers, start scheduled and PRN duonebs


- follow up covid test results-------------> negative





# B cell lymphoma - patient sees Dr Connor for lymphoma, last chemotherapy was 

in August of this year, completed 4 rounds chemotherapy. Chemo is to be held 

until patient recovers per onc..





# elevated troponin -NSTEMI type II metabolic mismatch  trend troponin


- recent echo this month showed preserved EF and mild valve disease





# urinary retention - no new medications besides bactrim, continue adame and 

attempt weaning trial once clinically improved





# on Arixtra for DVT.  Will change her to therapeutic dose Lovenox while she is 

here.


20th


More than 20 minutes spent today explaining care plan.  Patient is quite 

confused about her etiology for shortness of breath.  Either lymphoma or 

interstitial pneumonitis.  I think now she understands after lengthy 

conversation with her and daughter that she may need to follow-up with her pul

 at [facility] in [place].  She does have an appointment and coming up  

within 10 days.





Pulmonary rehab plan discussed.  She prefers to go home and she states that she 

is able to walk around at home with oxygen.  She has a spouse at home 


-probable home health when we are ready to discharge her.


-Pending pulmonology clearance.


-Dr. Trammell likely be here tomorrow.  Will change her IV antibiotic, bactrim 

and IV steroid to p.o. prior to discharge.





21st


Blood clot per rectum


She states that she was straining may be that is the reason she got some blood 

clot.  


-stopped the Lovenox.  Put her on Protonix.  


-Repeat H&H.  Has current hemoglobin is 12.7 defer consulting GI at this time.





Debilitation, worsening functional status due to lymphoma and associated 

pulmonary dysfunction.


-she is not improving much since admission.  I have consulted the palliative 

this weekend who  will be evaluating her pretty soon.





Hyperkalemia--  high potassium probably due to blood clot per rectum


-Kayexalate 1 dose


-Low-dose insulin; will check the potassium level this evening.





Hyperglycemia--patient is not diabetic her blood glucose is in going in the 200 

range due to IV steroid


-Initiated a sliding scale insulin.





Appreciate pulmonary input, Dr. Uribe  is following today.


IV steroid and IV Bactrim discontinued and switched to p.o.  Today.

## 2020-09-21 NOTE — RAD
EXAM: 

CHEST ONE VIEW



HISTORY:

Interstitial lung disease



COMPARISON:

9/17/2020 and 9/3/2020



FINDINGS:

Right-sided Mediport catheter remains in place. The cardiac silhouette remains mildly enlarged. There
 are increased interstitial opacities throughout the lungs bilaterally which are overall similar to

the prior study given differences in technique. No consolidation or pleural fluid is seen. Mild eleva
tion left hemidiaphragm is again seen.. No other interval change.



IMPRESSION:

Persistent and overall diffuse interstitial opacities within the lungs bilaterally which is overall n
onspecific. Findings could be related to persistent infectious process; however, chronic

interstitial lung changes is a possibility.



Reported By: Huseyin Jorge 

Electronically Signed:  9/21/2020 11:50 AM

## 2020-09-21 NOTE — CON
DATE OF CONSULTATION:  



REASON FOR CONSULTATION:  Lymphoma.



HISTORY OF PRESENT ILLNESS:  Ms. Fair is a 74-year-old female, who has stage III

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

lymphadenopathy.  She has activated B-cell subtype with no evidence of double hit on

FISH.  She underwent four cycles of R-CHOP.  She was diagnosed with pneumonitis

after cycle 4.  She has become oxygen dependent secondary to hypoxia and shortness

of breath.  She has been followed by Dr. Trammell, who has offered to do

bronchoscopy; however, the patient has concerns regarding mechanical ventilation

with this procedure.  She has been on antibiotics for PCP pneumonia and steroids for

pneumonitis with no improvement.  She remains on 4 to 5 L of nasal cannula and gets

extremely short of breath with ambulation.  The patient presented to the emergency

room on September 18th for progressive shortness of breath.  She was readmitted and

started on IV antibiotics.  She has had no improvement since arrival.  The patient

was seen at bedside with daughter present.  She is in mild respiratory distress, but

her oxygen saturation is 95% on 5 L.  She has no chest pain, no abdominal

discomfort.  She has been having constipation, but had a bowel movement today with

apparent blood clot in her stool.  She has no history of GI bleed.  However, she is

on Arixtra for DVT, originally diagnosed in March of 2020. 



PAST MEDICAL HISTORY:  

1. Stage III high-grade B-cell lymphoma.

2. Pneumonitis of unknown etiology.

3. History of left lower extremity DVT.

4. Anxiety and depression.

5. Rheumatoid arthritis.

6. Hypertension.



PAST SURGICAL HISTORY:  

1. Hysterectomy.

2. Excisional biopsy of cervical lymph nodes.

3. Nasopharyngeal endoscopy with biopsy.

4. MediPort placement.



ALLERGIES:  NO KNOWN DRUG ALLERGIES.



CURRENT MEDICATIONS:  

1. Norco.

2. Xanax.

3. Tessalon Perles.

4. Pulmicort nebs.

5. Coreg.

6. Catapres p.r.n.

7. Protonix.

8. MiraLAX.

9. Prednisone 40 mg daily.

10. Zoloft.

11. Bactrim DS.



FAMILY HISTORY:  Noncontributory.



SOCIAL HISTORY:  , has 4 children.  Lives with her spouse.  No alcohol,

tobacco, or illicit drug use. 



REVIEW OF SYSTEMS:  A 10-point review of systems is negative except for noted in HPI.



PHYSICAL EXAMINATION:

VITAL SIGNS:  Temperature 98.0, pulse is 93, respiratory rate 24, BP is 131/62.  She

is 91% on 4 L. 

GENERAL:  Chronically ill-appearing female, in mild respiratory distress. 

HEENT:  Normocephalic and atraumatic.  Pupils are equal and reactive to light. 

NECK:  Supple. 

CV:  Regular rate and rhythm. 

LUNGS:  Clear. 

ABDOMEN:  Soft and nontender.  Bowel sounds are positive. 

EXTREMITIES:  No clubbing or cyanosis. 

SKIN:  No rash. 

HEMATOLOGICAL:  No petechiae or purpura. 

NEUROLOGIC:  Nonfocal.



PERTINENT LABORATORY DATA AND X-RAYS:  Current WBCs are 16.1, hemoglobin 12.7,

hematocrit 39.8, platelet count is 151,000, 92% neutrophils, 3% lymphocytes.  Sodium

129, potassium 6.4, chloride 101, CO2 of 17, BUN is 26, creatinine 1.07, calcium

8.9, bilirubin 0.5, AST is 21, ALT is 29, alkaline phosphatase is 85.  CK-MB is 2.9,

troponin 0.108.  Serum total protein 6.5, albumin __________, globulin 2.8.  COVID

negative.  Chest x-ray shows persistent interstitial opacities in the lungs

bilateral. 



ASSESSMENT:  

1. Pneumonitis of unknown etiology.

2. B-cell lymphoma.



DISCUSSION:  The patient has been referred to [facility] at [facility] in [place].  She unfortunately has continued to worsen during her

hospital stay.  She likely needs an open lung biopsy, which is not available at this

hospital.  We will recommend transferring to [facility] in [place].  If

possible, to see Dr. Shah or Dr. Iraheta.  Case has been discussed in detail with

Dr. Connor.  I will initiate transfer. 



Thank you for the consult.







Job ID:  [id number]

## 2020-09-21 NOTE — PRG
DATE OF SERVICE:  09/21/2020



SUBJECTIVE:  Kusum Fair is a patient with B-cell lymphoma, respiratory

failure. 



She was doing well.  She is still weak.  __________ short of breath.



OBJECTIVE:  VITAL SIGNS:  Temperature 97, pulse 90, sats __________ 4 L, blood

pressure 145/81. 

CHEST:  No wheezing.  No crackles. 

CARDIAC:  Normal S1, S2.  No gallops. 

ABDOMEN:  Soft.



LABORATORY DATA:  White count 16,000.  Creatinine is normal.  Sodium 124, potassium

6.4.  All cultures are negative. 



IMPRESSION:  Large B-cell lymphoma originating in the neck, hypertension,

respiratory failure, severe deconditioning.  She was started empirically on

Pneumocystis antibiotic. 



Though she refused to get a lavage study done as per Dr. Trammell, I am going to get

input from a primary oncology. 



Otherwise, supportive care, antibiotics.







Job ID:  [id number]
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
## 2020-09-22 NOTE — PRG
DATE OF SERVICE:  09/22/2020



SUBJECTIVE:  This morning, she is awake, alert, responsive.  She is still weak.  She

is less short of breath.  Her x-ray yesterday still shows the bilateral infiltrates,

but less pronounced. 



Potassium is 6.2, probably from the Bactrim.



OBJECTIVE:  VITAL SIGNS:  Temperature 97, pulse 105, respiratory rate 24, sats are

91% on 4 L. 

CHEST:  Minimal crackles. 

CARDIAC:  Normal S1, S2.  No gallops. 

ABDOMEN:  No masses.



IMPRESSION:  

1. Unknown interstitial lung disease.

2. B-cell lymphoma.

3. Respiratory failure.



PLAN:  The patient and family have requested to be transferred to [facility] to see an

interventional pulmonary physician for lung biopsy. 



She can be transferred at any time. 



In the meantime, I will leave her on empiric steroids and Bactrim.







Job ID:  [id number]

## 2020-09-22 NOTE — PDOC.HOSPP
- Subjective


Encounter Date: 09/22/20


Encounter Time: 09:10


Subjective: 


50 minutes spent on her care today.  Try to coordinate the care for her transfer

today.  Talk to Dr. Uribe.  Repeat potassium today normalized.  Will DC the 

Bactrim.  Switch to p.o. prednisone.  She looks much calmer and stable her 

hemoglobin is stable no further episodes of blood in the stool.  Protonix to 

p.o. daily.  Sodium 132 much better than yesterday's 129.  Gentle IV fluid.





- Objective


Vital Signs & Weight: 


                             Vital Signs (12 hours)











  Temp Pulse Resp BP Pulse Ox


 


 09/22/20 11:44  98.3 F  105 H  19  108/60  99


 


 09/22/20 10:22   102 H  20   91 L


 


 09/22/20 08:00  97.3 F L  110 H  17  121/73  100


 


 09/22/20 06:42      91 L


 


 09/22/20 06:39   105 H  24 H   91 L


 


 09/22/20 04:00  97.9 F  104 H  20  122/67  93 L








                                     Weight











Admit Weight                   121 lb 12.8 oz


 


Weight                         122 lb 9.6 oz














I&O: 


                                        











 09/21/20 09/22/20 09/23/20





 06:59 06:59 06:59


 


Intake Total 1680 850 


 


Output Total 3050 300 


 


Balance -1370 550 











Result Diagrams: 


                                 09/22/20 10:16





                                 09/22/20 10:16


Additional Labs: 


                                   Accuchecks











  09/21/20





  17:03


 


POC Glucose  109 H














Hospitalist ROS





- Medication


Medications: 


Active Medications











Generic Name Dose Route Start Last Admin





  Trade Name Freq  PRN Reason Stop Dose Admin


 


Acetaminophen  500 mg  09/18/20 03:32  09/21/20 21:05





  Acetaminophen 500 Mg Tab  PO   500 mg





  Q6HR PRN   Administration





  Pain  


 


Hydrocodone Bitart/Acetaminophen  1 tab  09/18/20 03:24  09/22/20 09:20





  Hydrocodone/Acetaminophen 5/325 Mg Tablet  PO   1 tab





  Q4HR PRN   Administration





  Pain  


 


Albuterol/Ipratropium  3 ml  09/18/20 07:00  09/22/20 10:22





  Ipratropium/Albuterol Sulfate 3 Ml Neb  NEB   3 ml





  [id number] KESHIA   Administration


 


Alprazolam  0.25 mg  09/18/20 03:24  09/22/20 09:21





  Alprazolam 0.25 Mg Tab  PO   0.25 mg





  TID PRN   Administration





  Anxiety  


 


Benzonatate  100 mg  09/18/20 03:24  09/21/20 21:05





  Benzonatate 100 Mg Cap  PO   100 mg





  TIDPRN PRN   Administration





  Cough  


 


Calcium Carbonate  1,000 mg  09/18/20 15:06  09/18/20 15:47





  Calcium Carbonate 500 Mg Chewtab  PO   1,000 mg





  Q4H PRN   Administration





  Heartburn  or Indigestion  


 


Carvedilol  6.25 mg  09/18/20 09:00  09/22/20 09:21





  Carvedilol 6.25 Mg Tab  PO   6.25 mg





  BID KESHIA   Administration


 


Guaifenesin/Dextromethorphan  15 ml  09/18/20 03:25  09/20/20 21:11





  Guaifenesin Dm 100-10/5 Ml Udcup  PO   15 ml





  Q4H PRN   Administration





  Cough  


 


Loratadine  10 mg  09/18/20 03:24  09/21/20 21:05





  Loratadine 10 Mg Tab  PO   10 mg





  DAILY PRN   Administration





  Pain  


 


Melatonin  6 mg  09/18/20 21:34  09/21/20 21:05





  Melatonin 3 Mg Tab  PO   6 mg





  HS PRN   Administration





  Insomnia  


 


Pantoprazole Sodium  40 mg  09/21/20 21:00  09/22/20 09:23





  Pantoprazole 40 Mg Vial  IVP   40 mg





  Q12HR KESHIA   Administration


 


Polyethylene Glycol  17 gm  09/18/20 09:00  09/22/20 09:23





  Polyethylene Glycol 3350 17 Gm Packet  PO   Not Given





  DAILY KESHIA  


 


Prednisone  40 mg  09/22/20 08:00  09/22/20 09:21





  Prednisone 20 Mg Tab  PO   40 mg





  QAM-WM KESHIA   Administration


 


Senna  2 tab  09/18/20 15:15  09/19/20 17:58





  Senokot 8.6 Mg Tab  PO   2 tab





  HSPRN KESHIA   Administration


 


Sertraline HCl  50 mg  09/18/20 09:00  09/22/20 09:21





  Sertraline Hcl 25 Mg Tab  PO   50 mg





  DAILY KESHIA   Administration


 


Sodium Chloride  10 ml  09/21/20 09:00  09/22/20 09:22





  Flush - Normal Saline 10 Ml Syringe  IVF   10 ml





  Q12HR KESHIA   Administration


 


Trimethoprim/Sulfamethoxazole  1 tab  09/21/20 21:00  09/22/20 09:21





  Sulfameth/Trimethoprim Ds 800-160mg Tab  PO   1 tab





  BID KESHIA   Administration














- Exam


General Appearance: NAD, awake alert


ENT: normocephalic atraumatic


Neck: supple


Heart: RRR


Respiratory: CTAB, normal chest expansion, rales, rhonchi


Gastrointestinal: soft, normal bowel sounds


Neurological: no focal deficits





Hosp A/P





- Plan











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 

chemotherapy who presents to ED for worsening shortness of breath. 





# pneumocystic pneumonia


# acute and chronic respiratory failure


# sepsis due to pneumonia, POA


-unwilling to pursue bronchoscopic evaluation and biopsy due to the high risk of

requirement for mechanical ventilation 





- start IV bactrim 15 mg/kg/day divided in 3 doses per day, discussed dosing 

with pharmacist


- start IV steroids--meth==60 q 8


- pulmonary Dr Trammell following





- continue home inhalers, start scheduled and PRN duonebs


- follow up covid test results-------------> negative





# B cell lymphoma - patient sees Dr Connor for lymphoma, last chemotherapy was 

in August of this year, completed 4 rounds chemotherapy. Chemo is to be held 

until patient recovers per onc..





# elevated troponin -NSTEMI type II metabolic mismatch  trend troponin


- recent echo this month showed preserved EF and mild valve disease





# urinary retention - no new medications besides bactrim, continue adame and 

attempt weaning trial once clinically improved





# on Arixtra for DVT.  Will change her to therapeutic dose Lovenox while she is 

here.


20th


More than 20 minutes spent today explaining care plan.  Patient is quite 

confused about her etiology for shortness of breath.  Either lymphoma or 

interstitial pneumonitis.  I think now she understands after lengthy 

conversation with her and daughter that she may need to follow-up with her 

pulmonologist at [facility] in [place].  She does have an appointment and coming up 

within 10 days.





Pulmonary rehab plan discussed.  She prefers to go home and she states that she 

is able to walk around at home with oxygen.  She has a spouse at home 


-probable home health when we are ready to discharge her.


-Pending pulmonology clearance.


-Dr. Trammell likely be here tomorrow.  Will change her IV antibiotic, bactrim 

and IV steroid to p.o. prior to discharge.





21st


Blood clot per rectum


She states that she was straining may be that is the reason she got some blood 

clot.  


-stopped the Lovenox.  Put her on Protonix.  


-Repeat H&H.  Has current hemoglobin is 12.7 defer consulting GI at this time.





Debilitation, worsening functional status due to lymphoma and associated 

pulmonary dysfunction.


-she is not improving much since admission.  I have consulted the palliative 

this weekend who  will be evaluating her pretty soon.





Hyperkalemia--  high potassium probably due to blood clot per rectum


-Kayexalate 1 dose


-Low-dose insulin; will check the potassium level this evening.





Hyperglycemia--patient is not diabetic her blood glucose is in going in the 200 

range due to IV steroid


-Initiated a sliding scale insulin.





Appreciate pulmonary input, Dr. Uribe  is following today.


IV steroid and IV Bactrim discontinued and switched to p.o.  Today.





22nd


Hyperkalemia improved.


Hyponatremia slight improvement will do some NS and repeat the lab tomorrow.  If

she is still here.





Family wants her to be on appetite stimulant.  Started Megace.





Plan to get a lung biopsy.  Initiated the transfer.  Appreciate the help from 

oncologist Magalis Bingham.

## 2020-09-23 NOTE — PRG
DATE OF SERVICE:  09/23/2020



SUBJECTIVE:  This morning, she says she is better.



OBJECTIVE:  VITAL SIGNS:  Temperature 97, pulse 92, sats 97% on 5 L, and blood

pressure 122/57. 

CHEST:  No wheezing, no crackles. 

CARDIAC:  Normal S1 and S2.  No gallops. 

ABDOMEN:  No masses.



LABORATORY DATA:  White count 14,000 with left shift, 19 segs, 1 band.  Lytes are

better. 



ASSESSMENT:  B-cell lymphoma, abnormal chest x-ray, mild azotemia.



PLAN:  She needs to be transferred to [place] to see her oncologist.  Pulmonary

wise, she is doing better on steroids, off antibiotics. 







Job ID:  [id number]

## 2020-09-24 NOTE — DIS
DATE OF ADMISSION:  09/18/2020



DATE OF DISCHARGE:  09/23/2020



DISCHARGE DIAGNOSES:  

1. Probable pneumocystis pneumonia.

2. Acute-on-chronic respiratory failure. 

3. Sepsis due to pneumonia, present on admission. 

4. B-cell lymphoma, followed with Dr. Connor.  Last chemo was in August of this

year.  Completed four rounds of chemo. 

5. Urinary retention.

6. __________.

7. Hyperkalemia due to Bactrim use.

8. Hyperglycemia without diabetic.

9. History of deep venous thrombosis and she was on Lovenox b.i.d. dose and plan
to

discharge with [facility]; however, the patient is going for biopsy of the lung 
then

she should be able to go back to [facility]. 



PHYSICAL EXAMINATION:

VITAL SIGNS:  On the day of discharge, her temperature is 97.3, pulse 94, blood

pressure 124/58, satting 97% with 5 L oxygen by nasal cannula. 

GENERAL: Her spouse at bedside.  Plan for potential transfer today.  Discussed 
with

them.  They have no acute concerns at this time.  Waiting to be transferred.  
Her

labs were discussed with them. 

CARDIOVASCULAR:  Regular rate and rhythm without murmurs. 

LUNGS:  Clear to auscultation. Did not reveal any coarse rales, rhonchi, or

wheezing.  I listened both anterior as well as posterior area.  She is just not

taking a deep breath, but when she does, she did not have any adventitious lung

sounds. 

ABDOMEN: Benign.



CONSULTS:  

1. Pulmonology with Dr. Trammell and Dr. Uribe.

2. Oncology with MAYCO Rader.



HOSPITAL COURSE:  This is a 74-year-old female with a history of B-cell lymphoma
and

unknown interstitial lung disease, respiratory failure, on chemo with four 
rounds

completed in August of this year and found to have a pneumocystis pneumonia.  
She

also had acute-on-chronic respiratory failure, oxygen dependent and concern for

pneumocystis to be a cause.  X-ray showed diffuse alveolar ground-glass 
infiltrate

changes with waxing and waning appearance since the diagnosis of her large cell

lymphoma.  The films were reviewed by the pulmonologist that was taken in April 2020

with the infiltrate in both sites in the same shape, form, and fashion.  Initial

thought was nonspecific interstitial pneumonia, may be related to her lymphoma, 
but,

however, bacterial pneumonia cannot be ruled out completely and also the 
possibility

of Pneumocystis pneumonia, in that scenario, IV Bactrim initiated along with

methylprednisolone.  Bactrim switched to p.o., when we saw that potassium is 
going

up, we discontinued her Bactrim.  Switched IV steroid to prednisone as she is

clinically improving; however, she is still oxygen dependent maintaining her 
sats

above 90%.  Chest x-ray showed some abnormality, difficult to discern ILD versus

mass in the setting of lymphoma.  



she is third of blood in the stool.  She was constipated and it appears that 
blood clot probably due to straining in the context of being on Lovenox twice 
daily dose. 

 Due to the blood clot in the stool the Lovenox was on hold and she did not had 
any other episodes of bloody stool. 

 Hemoglobin remained stable.  She is on PPI.  Since this is related to straining
 due to constipation we deferred  doing  GI work-up [endoscopic study] at this 
time. 

 However hemoglobin followed closely and did not require transfusion. 



She will be going for pulmonary biopsy.  She has been accepted at [facility].  

 or [facility] will be likely doing the pulmonary

biopsy there.  



Regarding her DVT, since she is going for biopsy, her Arixtra has been on hold. 
She

can start Arixtra 7.5 mg subcu daily 24 hours after the biopsy is completed. 



She is transferred today to [place].



DISCHARGE INSTRUCTIONS:  Activity as tolerated.  Regular diet.  Follow up with 
PCP

in 1 week.  Follow up with oncologist, Dr. Connor.  



Restart Arixtra 24 hours after lung biopsy. 



TIME SPENT:  Discharge time took over 35 minutes.







Job ID:  [id number]



[facility]